# Patient Record
Sex: FEMALE | Race: BLACK OR AFRICAN AMERICAN | Employment: UNEMPLOYED | URBAN - METROPOLITAN AREA
[De-identification: names, ages, dates, MRNs, and addresses within clinical notes are randomized per-mention and may not be internally consistent; named-entity substitution may affect disease eponyms.]

---

## 2016-09-21 LAB
CHLAMYDIA, EXTERNAL: NEGATIVE
N. GONORRHEA, EXTERNAL: NEGATIVE

## 2016-10-05 LAB
ANTIBODY SCREEN, EXTERNAL: NEGATIVE
HBSAG, EXTERNAL: NEGATIVE
HCT, EXTERNAL: 38.7
HGB, EXTERNAL: 12.8
HIV, EXTERNAL: NEGATIVE
PLATELET CNT,   EXTERNAL: 297
RPR, EXTERNAL: NORMAL
RUBELLA, EXTERNAL: NORMAL
TYPE, ABO & RH, EXTERNAL: NORMAL
URINALYSIS, EXTERNAL: NEGATIVE

## 2017-02-02 ENCOUNTER — HOSPITAL ENCOUNTER (OUTPATIENT)
Age: 37
Setting detail: OBSERVATION
Discharge: HOME | End: 2017-02-02
Attending: OBSTETRICS & GYNECOLOGY | Admitting: OBSTETRICS & GYNECOLOGY
Payer: COMMERCIAL

## 2017-02-02 VITALS — WEIGHT: 201 LBS | BODY MASS INDEX: 32.3 KG/M2 | TEMPERATURE: 98.7 F | HEIGHT: 66 IN

## 2017-02-02 DIAGNOSIS — R51.9 NONINTRACTABLE EPISODIC HEADACHE, UNSPECIFIED HEADACHE TYPE: Primary | ICD-10-CM

## 2017-02-02 PROCEDURE — 99281 EMR DPT VST MAYX REQ PHY/QHP: CPT

## 2017-02-02 PROCEDURE — 99283 EMERGENCY DEPT VISIT LOW MDM: CPT | Performed by: OBSTETRICS & GYNECOLOGY

## 2017-02-02 PROCEDURE — 99218 HC RM OBSERVATION: CPT

## 2017-02-02 RX ORDER — PROMETHAZINE HYDROCHLORIDE 12.5 MG/1
TABLET ORAL
COMMUNITY
End: 2017-05-20

## 2017-02-02 RX ORDER — ALBUTEROL SULFATE 0.83 MG/ML
2.5 SOLUTION RESPIRATORY (INHALATION)
Qty: 24 EACH | Refills: 0 | Status: SHIPPED | OUTPATIENT
Start: 2017-02-02 | End: 2019-07-31 | Stop reason: SDUPTHER

## 2017-02-02 NOTE — DISCHARGE INSTRUCTIONS

## 2017-02-02 NOTE — PROGRESS NOTES
Pt arrived via wheelchair from ED c/o headache, body aches, general malaise, and back and abdominal pain. Vital signs stable. Pt placed on external monitor. No contractions noted. Positive fetal movement. SVE closed per Dr. Mario Paula. New orders to transfer to ED for further evaluation.

## 2017-02-02 NOTE — H&P
History & Physical    Name: Alireza Champagne MRN: [id number]  SSN: [id number]    YOB: 1980  Age: 31 y.o. Sex: female        Subjective:     Estimated Date of Delivery: 17  OB History      Para Term  AB TAB SAB Ectopic Multiple Living    4 3 3       3          Ms. Nael Kaur is admitted with pregnancy at 25w1d for evaluation from the ED for abdm pain and n/v/ diarrhea. Whole family sick and here in ED with her. ED declined to evaluate patient downstairs and sent to L&D. Prenatal course was normal. No records available now, states uncomplicated pregnancy. +FM, no vb ?uc    Past Medical History   Diagnosis Date    Asthma      as a child     History reviewed. No pertinent past surgical history. Social History     Occupational History    Not on file. Social History Main Topics    Smoking status: Never Smoker    Smokeless tobacco: Not on file    Alcohol use No    Drug use: No    Sexual activity: Yes     Partners: Male     Birth control/ protection: None     Family History   Problem Relation Age of Onset    Asthma Mother     Asthma Brother        No Known Allergies  Prior to Admission medications    Medication Sig Start Date End Date Taking? Authorizing Provider   promethazine (PHENERGAN) 12.5 mg tablet Take  by mouth every eight (8) hours as needed for Nausea. Yes Historical Provider        Review of Systems: Pertinent items are noted in HPI.     Objective:     Vitals:  Vitals:    17 1707 17 1711   Temp:  98.7 °F (37.1 °C)   Weight: 91.2 kg (201 lb)    Height: 5' 6\" (1.676 m)         Physical Exam:  General NAD  CVS: RRR no r/mg  Pulmonary: CTAB  Abdm: gravid NT  Back: LEE CVA NT, spine NT  LEE LE NT w/o edema  [place]: none  Membranes:  Intact  Fetal Heart Rate: Reactive  Baseline: 140 per minute  Variability: moderate  Accelerations: yes  Reactive for GA  Decelerations: none  SVE: long/cl  EFW: small  abdm soft NT no uC  OB stable    Prenatal Labs:   No results found for: RUBELLAEXT, GRBSEXT, HBSAGEXT, HIVEXT, RPREXT, GONNOEXT, CHLAMEXT     Assessment/Plan:     Plan: Admit for iup 25wks OB stable w/ systemic flu/GI symptoms along with family. recommend OB evaluation. ques answered, pt and family understand and agree. keep scheduled appts. Group B Strep was not tested.     Signed By:  Marcus Molina MD     February 2, 2017

## 2017-02-02 NOTE — ED TRIAGE NOTES
Pt is 6 months pregnant, , abdominal and back pain, generalized body aches, contractions and headaches. Spoke with labor and delivery charge rn, patient to labor and delivery with ed paramedic.

## 2017-02-24 LAB — GTT, 1 HR, GLUCOLA, EXTERNAL: 107

## 2017-03-09 ENCOUNTER — HOSPITAL ENCOUNTER (OUTPATIENT)
Dept: PERINATAL CARE | Age: 37
Discharge: HOME | End: 2017-03-09
Attending: OBSTETRICS & GYNECOLOGY

## 2017-03-10 ENCOUNTER — HOSPITAL ENCOUNTER (OUTPATIENT)
Dept: PERINATAL CARE | Age: 37
Discharge: HOME | End: 2017-03-10
Attending: OBSTETRICS & GYNECOLOGY
Payer: COMMERCIAL

## 2017-03-10 PROCEDURE — 76811 OB US DETAILED SNGL FETUS: CPT | Performed by: OBSTETRICS & GYNECOLOGY

## 2017-03-15 ENCOUNTER — HOSPITAL ENCOUNTER (OUTPATIENT)
Age: 37
Setting detail: OBSERVATION
Discharge: HOME | End: 2017-03-15
Attending: OBSTETRICS & GYNECOLOGY | Admitting: STUDENT IN AN ORGANIZED HEALTH CARE EDUCATION/TRAINING PROGRAM
Payer: COMMERCIAL

## 2017-03-15 VITALS
TEMPERATURE: 98.2 F | WEIGHT: 201 LBS | HEIGHT: 68 IN | OXYGEN SATURATION: 99 % | DIASTOLIC BLOOD PRESSURE: 77 MMHG | BODY MASS INDEX: 30.46 KG/M2 | SYSTOLIC BLOOD PRESSURE: 128 MMHG | RESPIRATION RATE: 16 BRPM | HEART RATE: 92 BPM

## 2017-03-15 LAB
APPEARANCE UR: CLEAR
BACTERIA URNS QL MICRO: ABNORMAL /HPF
BILIRUB UR QL: NEGATIVE
COLOR UR: ABNORMAL
EPITH CASTS URNS QL MICRO: ABNORMAL /LPF
GLUCOSE UR STRIP.AUTO-MCNC: NEGATIVE MG/DL
HGB UR QL STRIP: NEGATIVE
HYALINE CASTS URNS QL MICRO: ABNORMAL /LPF (ref 0–5)
KETONES UR QL STRIP.AUTO: NEGATIVE MG/DL
LEUKOCYTE ESTERASE UR QL STRIP.AUTO: NEGATIVE
NITRITE UR QL STRIP.AUTO: NEGATIVE
PH UR STRIP: 8 [PH] (ref 5–8)
PROT UR STRIP-MCNC: NEGATIVE MG/DL
RBC #/AREA URNS HPF: ABNORMAL /HPF (ref 0–5)
SP GR UR REFRACTOMETRY: 1.02 (ref 1–1.03)
UA: UC IF INDICATED,UAUC: ABNORMAL
UROBILINOGEN UR QL STRIP.AUTO: 1 EU/DL (ref 0.2–1)
WBC URNS QL MICRO: ABNORMAL /HPF (ref 0–4)

## 2017-03-15 PROCEDURE — 74011250637 HC RX REV CODE- 250/637: Performed by: STUDENT IN AN ORGANIZED HEALTH CARE EDUCATION/TRAINING PROGRAM

## 2017-03-15 PROCEDURE — 87086 URINE CULTURE/COLONY COUNT: CPT | Performed by: STUDENT IN AN ORGANIZED HEALTH CARE EDUCATION/TRAINING PROGRAM

## 2017-03-15 PROCEDURE — 99218 HC RM OBSERVATION: CPT

## 2017-03-15 PROCEDURE — 99283 EMERGENCY DEPT VISIT LOW MDM: CPT | Performed by: OBSTETRICS & GYNECOLOGY

## 2017-03-15 PROCEDURE — 81001 URINALYSIS AUTO W/SCOPE: CPT | Performed by: STUDENT IN AN ORGANIZED HEALTH CARE EDUCATION/TRAINING PROGRAM

## 2017-03-15 PROCEDURE — 75810000275 HC EMERGENCY DEPT VISIT NO LEVEL OF CARE

## 2017-03-15 RX ORDER — ACETAMINOPHEN 325 MG/1
650 TABLET ORAL
Status: DISCONTINUED | OUTPATIENT
Start: 2017-03-15 | End: 2017-03-16 | Stop reason: HOSPADM

## 2017-03-15 RX ADMIN — ACETAMINOPHEN 650 MG: 325 TABLET ORAL at 20:09

## 2017-03-15 NOTE — IP AVS SNAPSHOT
Current Discharge Medication List  
  
ASK your doctor about these medications Dose & Instructions Dispensing Information Comments Morning Noon Evening Bedtime  
 albuterol 2.5 mg /3 mL (0.083 %) nebulizer solution Commonly known as:  PROVENTIL VENTOLIN Your last dose was: Your next dose is:    
   
   
 Dose:  2.5 mg  
3 mL by Nebulization route every four (4) hours as needed for Wheezing. Quantity:  24 Each Refills:  0  
     
   
   
   
  
 promethazine 12.5 mg tablet Commonly known as:  PHENERGAN Your last dose was: Your next dose is: Take  by mouth every eight (8) hours as needed for Nausea. Refills:  0

## 2017-03-15 NOTE — PROGRESS NOTES
1932  Patient arrived on unit in wheelchair with family. Urine sample obtained. Patient complaint of sharp pain in abdomen feeling like she needs to have bowel movement and may be having contractions. Patient came to hospital prior to calling physician. Patient placed on monitor for fetal monitoring. 2000  Spoke with Dr Tay Burden, orders received for UA and NST and 650 mg tylenol and encourage PO intake. 2040  Patient states she continues to have abdominal pain, palpated her uterus during one of her painful episodes and it remains soft. Patient encouraged to drink additional water and we will reevaluate shortly. 2125  Spoke with Dr Tay Burden, relaying information about reactive NST and patient continuing to have constant abdominal pain. Per Dr Tay Burden, ok to discharge patient home , encourage water intake and call office in the am to be seen tomorrow if pain persists. Also instruct patient that if pain becomes worse, call on call physician tonight. 2130  Patient agreeable to discharge.

## 2017-03-15 NOTE — IP AVS SNAPSHOT
[address] [place] [address] 104 1007 [facility] 
[phone] Patient: Marielle Poster MRN: [id number] DTY:[birth year] You are allergic to the following No active allergies Recent Documentation Height Weight BMI OB Status Smoking Status 1.727 m 91.2 kg 30.56 kg/m2 Pregnant Never Smoker Unresulted Labs Order Current Status CULTURE, URINE In process Emergency Contacts Name Discharge Info Relation Home Work Mobile Leighann Pacheco DISCHARGE CAREGIVER [3] Boyfriend [17] [phone] About your hospitalization You were admitted on:  March 15, 2017 You last received care in the:  [facility] 2 LABOR & DELIVERY You were discharged on:  March 15, 2017 Unit phone number:  [phone] Why you were hospitalized Your primary diagnosis was:  Not on File Providers Seen During Your Hospitalizations Provider Role Specialty Primary office phone Maria Ines Martin MD Attending Provider Obstetrics & Gynecology [phone] Your Primary Care Physician (PCP) Primary Care Physician Office Phone Office Fax NONE ** None ** ** None ** Follow-up Information Follow up With Details Comments Contact Info None   None (395) Patient stated that they have no PCP Current Discharge Medication List  
  
ASK your doctor about these medications Dose & Instructions Dispensing Information Comments Morning Noon Evening Bedtime  
 albuterol 2.5 mg /3 mL (0.083 %) nebulizer solution Commonly known as:  PROVENTIL VENTOLIN Your last dose was: Your next dose is:    
   
   
 Dose:  2.5 mg  
3 mL by Nebulization route every four (4) hours as needed for Wheezing. Quantity:  24 Each Refills:  0  
     
   
   
   
  
 promethazine 12.5 mg tablet Commonly known as:  PHENERGAN Your last dose was: Your next dose is: Take  by mouth every eight (8) hours as needed for Nausea. Refills:  0 Discharge Instructions Please call the office [phone] when they open at 8:30am and ask to be seen regarding your abdominal pain. Let them know you were here tonight for abdominal pain and were encouraged to call the office to be seen. If your pain becomes worse tonight, please call the on call doctor at [phone]. Continue to drink plenty of fluid, aim for at least 4 of the cups you received here at the hospital.   
 
 
 Weeks 30 to 32 of Your Pregnancy: Care Instructions Your Care Instructions You have made it to the final months of your pregnancy. By now, your baby is really starting to look like a baby, with hair and plump skin. As you enter the final weeks of pregnancy, the reality of having a baby may start to set in. This is the time to settle on a name, get your household in order, set up a safe nursery, and find quality  if needed. Doing these things in advance will allow you to focus on caring for and enjoying your new baby. You may also want to have a tour of your hospital's labor and delivery unit to get a better idea of what to expect while you are in the hospital. 
During these last months, it is very important to take good care of yourself and pay attention to what your body needs. If your doctor says it is okay for you to work, don't push yourself too hard. Use the tips provided in this care sheet to ease heartburn and care for varicose veins. If you haven't already had the Tdap shot during this pregnancy, talk to your doctor about getting it. It will help protect your  against pertussis infection. Follow-up care is a key part of your treatment and safety. Be sure to make and go to all appointments, and call your doctor if you are having problems. It's also a good idea to know your test results and keep a list of the medicines you take. How can you care for yourself at home? Pay attention to your baby's movements · You should feel your baby move several times every day. · Your baby now turns less, and kicks and jabs more. · Your baby sleeps 20 to 45 minutes at a time and is more active at certain times of day. · If your doctor wants you to count your baby's kicks: 
¨ Empty your bladder, and lie on your side or relax in a comfortable chair. ¨ Write down your start time. ¨ Pay attention only to your baby's movements. Count any movement except hiccups. ¨ After you have counted 10 movements, write down your stop time. ¨ Write down how many minutes it took for your baby to move 10 times. ¨ If an hour goes by and you have not recorded 10 movements, have something to eat or drink and then count for another hour. If you do not record 10 movements in either hour, call your doctor. Ease heartburn · Eat small, frequent meals. · Do not eat chocolate, peppermint, or very spicy foods. Avoid drinks with caffeine, such as coffee, tea, and sodas. · Avoid bending over or lying down after meals. · Talk a short walk after you eat. · If heartburn is a problem at night, do not eat for 2 hours before bedtime. · Take antacids like Mylanta, Maalox, Rolaids, or Tums. Do not take antacids that have sodium bicarbonate. Care for varicose veins · Varicose veins are blood vessels that stretch out with the extra blood during pregnancy. Your legs may ache or throb. Most varicose veins will go away after the birth. · Avoid standing for long periods of time. Sit with your legs crossed at the ankles, not the knees. · Sit with your feet propped up. · Avoid tight clothing or stockings. Wear support hose. · Exercise regularly. Try walking for at least 30 minutes a day. Where can you learn more? Go to http://glnen-alex.info/. Enter D774 in the search box to learn more about \"Weeks 30 to 32 of Your Pregnancy: Care Instructions. \" 
 Current as of: May 30, 2016 Content Version: 11.1 © 3742-0575 [employer] Care instructions adapted under license by Transfer[employer] (which disclaims liability or warranty for this information). If you have questions about a medical condition or this instruction, always ask your healthcare professional. [address] any warranty or liability for your use of this information. Counting Your Baby's Kicks: Care Instructions Your Care Instructions Counting your baby's kicks is one way your doctor can tell that your baby is healthy. Most womenespecially in a first pregnancyfeel their baby move for the first time between 16 and 22 weeks. The movement may feel like flutters rather than kicks. Your baby may move more at certain times of the day. When you are active, you may notice less kicking than when you are resting. At your prenatal visits, your doctor will ask whether the baby is active. In your last trimester, your doctor may ask you to count the number of times you feel your baby move. Follow-up care is a key part of your treatment and safety. Be sure to make and go to all appointments, and call your doctor if you are having problems. It's also a good idea to know your test results and keep a list of the medicines you take. How do you count fetal kicks? · A common method of checking your baby's movement is to count the number of kicks or moves you feel in 1 hour. Ten movements (such as kicks, flutters, or rolls) in 1 hour are normal. Some doctors suggest that you count in the morning until you get to 10 movements. Then you can quit for that day and start again the next day. · Pick your baby's most active time of day to count. This may be any time from morning to evening. · If you do not feel 10 movements in an hour, your baby may be sleeping. Wait for the next hour and count again. When should you call for help? Call your doctor now or seek immediate medical care if: 
· You noticed that your baby has stopped moving or is moving much less than normal. 
Watch closely for changes in your health, and be sure to contact your doctor if you have any problems. Where can you learn more? Go to http://glenn-alex.info/. Enter A404 in the search box to learn more about \"Counting Your Baby's Kicks: Care Instructions. \" Current as of: May 30, 2016 Content Version: 11.1 © 1630-3565 [employer]. Care instructions adapted under license by Talkpush (which disclaims liability or warranty for this information). If you have questions about a medical condition or this instruction, always ask your healthcare professional. [address] any warranty or liability for your use of this information. Discharge Orders None Introducing hospitals & HEALTH SERVICES! [facility] introduces [email or web address] patient portal. Now you can access parts of your medical record, email your doctor's office, and request medication refills online. 1. In your internet browser, go to [email or web address] 2. Click on the First Time User? Click Here link in the Sign In box. You will see the New Member Sign Up page. 3. Enter your [email or web address] Access Code exactly as it appears below. You will not need to use this code after youve completed the sign-up process. If you do not sign up before the expiration date, you must request a new code. · [email or web address] Access Code: [email or web address] Expires: 5/3/2017  6:52 PM 
 
4. Enter the last four digits of your Social Security Number (xxxx) and Date of Birth (mm/dd/yyyy) as indicated and click Submit. You will be taken to the next sign-up page. 5. Create a [email or web address] ID. This will be your [email or web address] login ID and cannot be changed, so think of one that is secure and easy to remember. 6. Create a [email or web address] password. You can change your password at any time. 7. Enter your Password Reset Question and Answer. This can be used at a later time if you forget your password. 8. Enter your e-mail address. You will receive e-mail notification when new information is available in [address] 9. Click Sign Up. You can now view and download portions of your medical record. 10. Click the Download Summary menu link to download a portable copy of your medical information. If you have questions, please visit the Frequently Asked Questions section of the Gameyola website. Remember, Gameyola is NOT to be used for urgent needs. For medical emergencies, dial 911. Now available from your iPhone and Android! General Information Please provide this summary of care documentation to your next provider. Patient Signature:  ____________________________________________________________ Date:  ____________________________________________________________  
  
[facility] Provider Signature:  ____________________________________________________________ Date:  ____________________________________________________________

## 2017-03-15 NOTE — IP AVS SNAPSHOT
Summary of Care Report The Summary of Care report has been created to help improve care coordination. Users with access to TUTORize or [address] (Web-based application) may access additional patient information including the Discharge Summary. If you are not currently a [address] user and need more information, please call the number listed below in the [place] [address] section and ask to be connected with Medical Records. Facility Information Name Address Phone [address] [facility] [place] [address] [phone] Patient Information Patient Name Sex  Jake Folds ([id number]) Female 1980 Discharge Information Admitting Provider Service Area Unit Anant Vasquez, DO / [phone] [facility] 2 Labor & Delivery / [phone] Discharge Provider Discharge Date/Time Discharge Disposition Destination (none) 3/15/2017 21:31 (Pending) AHR (none) Patient Language Language ENGLISH [13] You are allergic to the following No active allergies Current Discharge Medication List  
  
ASK your doctor about these medications Dose & Instructions Dispensing Information Comments  
 albuterol 2.5 mg /3 mL (0.083 %) nebulizer solution Commonly known as:  PROVENTIL VENTOLIN Dose:  2.5 mg  
3 mL by Nebulization route every four (4) hours as needed for Wheezing. Quantity:  24 Each Refills:  0  
   
 promethazine 12.5 mg tablet Commonly known as:  PHENERGAN Take  by mouth every eight (8) hours as needed for Nausea. Refills:  0 Follow-up Information Follow up With Details Comments Contact Info None   None (395) Patient stated that they have no PCP Discharge Instructions Please call the office [phone] when they open at 8:30am and ask to be seen regarding your abdominal pain. Let them know you were here tonight for abdominal pain and were encouraged to call the office to be seen. If your pain becomes worse tonight, please call the on call doctor at [phone]. Continue to drink plenty of fluid, aim for at least 4 of the cups you received here at the hospital.   
 
 
 Weeks 30 to 32 of Your Pregnancy: Care Instructions Your Care Instructions You have made it to the final months of your pregnancy. By now, your baby is really starting to look like a baby, with hair and plump skin. As you enter the final weeks of pregnancy, the reality of having a baby may start to set in. This is the time to settle on a name, get your household in order, set up a safe nursery, and find quality  if needed. Doing these things in advance will allow you to focus on caring for and enjoying your new baby. You may also want to have a tour of your hospital's labor and delivery unit to get a better idea of what to expect while you are in the hospital. 
During these last months, it is very important to take good care of yourself and pay attention to what your body needs. If your doctor says it is okay for you to work, don't push yourself too hard. Use the tips provided in this care sheet to ease heartburn and care for varicose veins. If you haven't already had the Tdap shot during this pregnancy, talk to your doctor about getting it. It will help protect your  against pertussis infection. Follow-up care is a key part of your treatment and safety. Be sure to make and go to all appointments, and call your doctor if you are having problems. It's also a good idea to know your test results and keep a list of the medicines you take. How can you care for yourself at home? Pay attention to your baby's movements · You should feel your baby move several times every day. · Your baby now turns less, and kicks and jabs more. · Your baby sleeps 20 to 45 minutes at a time and is more active at certain times of day. · If your doctor wants you to count your baby's kicks: 
¨ Empty your bladder, and lie on your side or relax in a comfortable chair. ¨ Write down your start time. ¨ Pay attention only to your baby's movements. Count any movement except hiccups. ¨ After you have counted 10 movements, write down your stop time. ¨ Write down how many minutes it took for your baby to move 10 times. ¨ If an hour goes by and you have not recorded 10 movements, have something to eat or drink and then count for another hour. If you do not record 10 movements in either hour, call your doctor. Ease heartburn · Eat small, frequent meals. · Do not eat chocolate, peppermint, or very spicy foods. Avoid drinks with caffeine, such as coffee, tea, and sodas. · Avoid bending over or lying down after meals. · Talk a short walk after you eat. · If heartburn is a problem at night, do not eat for 2 hours before bedtime. · Take antacids like Mylanta, Maalox, Rolaids, or Tums. Do not take antacids that have sodium bicarbonate. Care for varicose veins · Varicose veins are blood vessels that stretch out with the extra blood during pregnancy. Your legs may ache or throb. Most varicose veins will go away after the birth. · Avoid standing for long periods of time. Sit with your legs crossed at the ankles, not the knees. · Sit with your feet propped up. · Avoid tight clothing or stockings. Wear support hose. · Exercise regularly. Try walking for at least 30 minutes a day. Where can you learn more? Go to http://glenn-alex.info/. Enter E510 in the search box to learn more about \"Weeks 30 to 32 of Your Pregnancy: Care Instructions. \" Current as of: May 30, 2016 Content Version: 11.1 © 6700-0135 [employer].  Care instructions adapted under license by Navmii (which disclaims liability or warranty for this information). If you have questions about a medical condition or this instruction, always ask your healthcare professional. [address] any warranty or liability for your use of this information. Counting Your Baby's Kicks: Care Instructions Your Care Instructions Counting your baby's kicks is one way your doctor can tell that your baby is healthy. Most womenespecially in a first pregnancyfeel their baby move for the first time between 16 and 22 weeks. The movement may feel like flutters rather than kicks. Your baby may move more at certain times of the day. When you are active, you may notice less kicking than when you are resting. At your prenatal visits, your doctor will ask whether the baby is active. In your last trimester, your doctor may ask you to count the number of times you feel your baby move. Follow-up care is a key part of your treatment and safety. Be sure to make and go to all appointments, and call your doctor if you are having problems. It's also a good idea to know your test results and keep a list of the medicines you take. How do you count fetal kicks? · A common method of checking your baby's movement is to count the number of kicks or moves you feel in 1 hour. Ten movements (such as kicks, flutters, or rolls) in 1 hour are normal. Some doctors suggest that you count in the morning until you get to 10 movements. Then you can quit for that day and start again the next day. · Pick your baby's most active time of day to count. This may be any time from morning to evening. · If you do not feel 10 movements in an hour, your baby may be sleeping. Wait for the next hour and count again. When should you call for help?  
Call your doctor now or seek immediate medical care if: 
· You noticed that your baby has stopped moving or is moving much less than normal. 
Watch closely for changes in your health, and be sure to contact your doctor if you have any problems. Where can you learn more? Go to http://glenn-alex.info/. Enter P505 in the search box to learn more about \"Counting Your Baby's Kicks: Care Instructions. \" Current as of: May 30, 2016 Content Version: 11.1 © 6618-4972 [employer]. Care instructions adapted under license by [employer] (which disclaims liability or warranty for this information). If you have questions about a medical condition or this instruction, always ask your healthcare professional. [place] [address] any warranty or liability for your use of this information. Chart Review Routing History No Routing History on File

## 2017-03-15 NOTE — ED TRIAGE NOTES
31 weeks pregnant sharp lower abdominal pain and back pain. Feels like she has to have BM for most of the day. Pt of Dr Royal Mckoy. Did not call.

## 2017-03-16 NOTE — DISCHARGE INSTRUCTIONS
Please call the office [phone] when they open at 8:30am and ask to be seen regarding your abdominal pain. Let them know you were here tonight for abdominal pain and were encouraged to call the office to be seen. If your pain becomes worse tonight, please call the on call doctor at [phone]. Continue to drink plenty of fluid, aim for at least 4 of the cups you received here at the hospital.         Weeks 30 to 32 of Your Pregnancy: Care Instructions  Your Care Instructions    You have made it to the final months of your pregnancy. By now, your baby is really starting to look like a baby, with hair and plump skin. As you enter the final weeks of pregnancy, the reality of having a baby may start to set in. This is the time to settle on a name, get your household in order, set up a safe nursery, and find quality  if needed. Doing these things in advance will allow you to focus on caring for and enjoying your new baby. You may also want to have a tour of your hospital's labor and delivery unit to get a better idea of what to expect while you are in the hospital.  During these last months, it is very important to take good care of yourself and pay attention to what your body needs. If your doctor says it is okay for you to work, don't push yourself too hard. Use the tips provided in this care sheet to ease heartburn and care for varicose veins. If you haven't already had the Tdap shot during this pregnancy, talk to your doctor about getting it. It will help protect your  against pertussis infection. Follow-up care is a key part of your treatment and safety. Be sure to make and go to all appointments, and call your doctor if you are having problems. It's also a good idea to know your test results and keep a list of the medicines you take. How can you care for yourself at home? Pay attention to your baby's movements  · You should feel your baby move several times every day.   · Your baby now turns less, and kicks and jabs more. · Your baby sleeps 20 to 45 minutes at a time and is more active at certain times of day. · If your doctor wants you to count your baby's kicks:  ¨ Empty your bladder, and lie on your side or relax in a comfortable chair. ¨ Write down your start time. ¨ Pay attention only to your baby's movements. Count any movement except hiccups. ¨ After you have counted 10 movements, write down your stop time. ¨ Write down how many minutes it took for your baby to move 10 times. ¨ If an hour goes by and you have not recorded 10 movements, have something to eat or drink and then count for another hour. If you do not record 10 movements in either hour, call your doctor. Ease heartburn  · Eat small, frequent meals. · Do not eat chocolate, peppermint, or very spicy foods. Avoid drinks with caffeine, such as coffee, tea, and sodas. · Avoid bending over or lying down after meals. · Talk a short walk after you eat. · If heartburn is a problem at night, do not eat for 2 hours before bedtime. · Take antacids like Mylanta, Maalox, Rolaids, or Tums. Do not take antacids that have sodium bicarbonate. Care for varicose veins  · Varicose veins are blood vessels that stretch out with the extra blood during pregnancy. Your legs may ache or throb. Most varicose veins will go away after the birth. · Avoid standing for long periods of time. Sit with your legs crossed at the ankles, not the knees. · Sit with your feet propped up. · Avoid tight clothing or stockings. Wear support hose. · Exercise regularly. Try walking for at least 30 minutes a day. Where can you learn more? Go to http://glenn-alex.info/. Enter J362 in the search box to learn more about \"Weeks 30 to 32 of Your Pregnancy: Care Instructions. \"  Current as of: May 30, 2016  Content Version: 11.1  © 3875-6320 [employer].  Care instructions adapted under license by [employer] (which disclaims liability or warranty for this information). If you have questions about a medical condition or this instruction, always ask your healthcare professional. [address] any warranty or liability for your use of this information. Counting Your Baby's Kicks: Care Instructions  Your Care Instructions  Counting your baby's kicks is one way your doctor can tell that your baby is healthy. Most women--especially in a first pregnancy--feel their baby move for the first time between 16 and 22 weeks. The movement may feel like flutters rather than kicks. Your baby may move more at certain times of the day. When you are active, you may notice less kicking than when you are resting. At your prenatal visits, your doctor will ask whether the baby is active. In your last trimester, your doctor may ask you to count the number of times you feel your baby move. Follow-up care is a key part of your treatment and safety. Be sure to make and go to all appointments, and call your doctor if you are having problems. It's also a good idea to know your test results and keep a list of the medicines you take. How do you count fetal kicks? · A common method of checking your baby's movement is to count the number of kicks or moves you feel in 1 hour. Ten movements (such as kicks, flutters, or rolls) in 1 hour are normal. Some doctors suggest that you count in the morning until you get to 10 movements. Then you can quit for that day and start again the next day. · Pick your baby's most active time of day to count. This may be any time from morning to evening. · If you do not feel 10 movements in an hour, your baby may be sleeping. Wait for the next hour and count again. When should you call for help?   Call your doctor now or seek immediate medical care if:  · You noticed that your baby has stopped moving or is moving much less than normal.  Watch closely for changes in your health, and be sure to contact your doctor if you have any problems. Where can you learn more? Go to http://glenn-alex.info/. Enter Z374 in the search box to learn more about \"Counting Your Baby's Kicks: Care Instructions. \"  Current as of: May 30, 2016  Content Version: 11.1  © 3796-7564 [employer]. Care instructions adapted under license by [employer] (which disclaims liability or warranty for this information). If you have questions about a medical condition or this instruction, always ask your healthcare professional. [address] any warranty or liability for your use of this information.

## 2017-03-17 LAB
BACTERIA SPEC CULT: ABNORMAL
CC UR VC: ABNORMAL
SERVICE CMNT-IMP: ABNORMAL

## 2017-03-31 ENCOUNTER — HOSPITAL ENCOUNTER (OUTPATIENT)
Dept: PERINATAL CARE | Age: 37
Discharge: HOME | End: 2017-03-31
Attending: OBSTETRICS & GYNECOLOGY
Payer: COMMERCIAL

## 2017-03-31 PROCEDURE — 76816 OB US FOLLOW-UP PER FETUS: CPT | Performed by: OBSTETRICS & GYNECOLOGY

## 2017-04-13 LAB — GRBS, EXTERNAL: NEGATIVE

## 2017-05-14 ENCOUNTER — HOSPITAL ENCOUNTER (EMERGENCY)
Age: 37
Discharge: HOME | End: 2017-05-14
Attending: OBSTETRICS & GYNECOLOGY | Admitting: OBSTETRICS & GYNECOLOGY
Payer: COMMERCIAL

## 2017-05-14 VITALS
BODY MASS INDEX: 32.49 KG/M2 | DIASTOLIC BLOOD PRESSURE: 71 MMHG | TEMPERATURE: 98 F | RESPIRATION RATE: 16 BRPM | HEART RATE: 84 BPM | WEIGHT: 207 LBS | HEIGHT: 67 IN | SYSTOLIC BLOOD PRESSURE: 112 MMHG

## 2017-05-14 PROCEDURE — 59025 FETAL NON-STRESS TEST: CPT | Performed by: OBSTETRICS & GYNECOLOGY

## 2017-05-14 PROCEDURE — 74011250636 HC RX REV CODE- 250/636: Performed by: OBSTETRICS & GYNECOLOGY

## 2017-05-14 PROCEDURE — 99284 EMERGENCY DEPT VISIT MOD MDM: CPT | Performed by: OBSTETRICS & GYNECOLOGY

## 2017-05-14 PROCEDURE — 96360 HYDRATION IV INFUSION INIT: CPT | Performed by: OBSTETRICS & GYNECOLOGY

## 2017-05-14 PROCEDURE — 75810000275 HC EMERGENCY DEPT VISIT NO LEVEL OF CARE

## 2017-05-14 RX ORDER — SODIUM CHLORIDE, SODIUM LACTATE, POTASSIUM CHLORIDE, CALCIUM CHLORIDE 600; 310; 30; 20 MG/100ML; MG/100ML; MG/100ML; MG/100ML
1000 INJECTION, SOLUTION INTRAVENOUS ONCE
Status: COMPLETED | OUTPATIENT
Start: 2017-05-14 | End: 2017-05-14

## 2017-05-14 RX ORDER — SODIUM CHLORIDE, SODIUM LACTATE, POTASSIUM CHLORIDE, CALCIUM CHLORIDE 600; 310; 30; 20 MG/100ML; MG/100ML; MG/100ML; MG/100ML
125 INJECTION, SOLUTION INTRAVENOUS CONTINUOUS
Status: DISCONTINUED | OUTPATIENT
Start: 2017-05-14 | End: 2017-05-14 | Stop reason: HOSPADM

## 2017-05-14 RX ADMIN — SODIUM CHLORIDE, SODIUM LACTATE, POTASSIUM CHLORIDE, AND CALCIUM CHLORIDE 125 ML/HR: 600; 310; 30; 20 INJECTION, SOLUTION INTRAVENOUS at 02:43

## 2017-05-14 RX ADMIN — SODIUM CHLORIDE, SODIUM LACTATE, POTASSIUM CHLORIDE, AND CALCIUM CHLORIDE 1000 ML: 600; 310; 30; 20 INJECTION, SOLUTION INTRAVENOUS at 01:45

## 2017-05-14 NOTE — IP AVS SNAPSHOT
Summary of Care Report The Summary of Care report has been created to help improve care coordination. Users with access to [employer] or [address] (Web-based application) may access additional patient information including the Discharge Summary. If you are not currently a [address] user and need more information, please call the number listed below in the [place] [address] section and ask to be connected with Medical Records. Facility Information Name Address Phone [address] [place] [id number] [phone] Patient Information Patient Name Sex  Bello Garcia ([id number]) Female 1980 Discharge Information Admitting Provider Service Area Unit Henri Prado MD / [phone] 2 Labor & Delivery / [phone] Discharge Provider Discharge Date/Time Discharge Disposition Destination (none) 2017 02:56 (Pending) AHR (none) Patient Language Language ENGLISH [13] You are allergic to the following No active allergies Current Discharge Medication List  
  
ASK your doctor about these medications Dose & Instructions Dispensing Information Comments  
 albuterol 2.5 mg /3 mL (0.083 %) nebulizer solution Commonly known as:  PROVENTIL VENTOLIN Dose:  2.5 mg  
3 mL by Nebulization route every four (4) hours as needed for Wheezing. Quantity:  24 Each Refills:  0  
   
 promethazine 12.5 mg tablet Commonly known as:  PHENERGAN Take  by mouth every eight (8) hours as needed for Nausea. Refills:  0 Follow-up Information Follow up With Details Comments Contact Info None   None ([phone]) Patient stated that they have no PCP Discharge Instructions MyChart Activation Thank you for requesting access to [facility]. Please follow the instructions below to securely access and download your online medical record. [facility] allows you to send messages to your doctor, view your test results, renew your prescriptions, schedule appointments, and more. How Do I Sign Up? 1. In your internet browser, go to [email or web address] 
2. Click on the First Time User? Click Here link in the Sign In box. You will be redirect to the New Member Sign Up page. 3. Enter your [facility] Access Code exactly as it appears below. You will not need to use this code after youve completed the sign-up process. If you do not sign up before the expiration date, you must request a new code. [facility] Access Code: [email or web address] Expires: 2017  2:57 AM (This is the date your [facility] access code will ) 4. Enter the last four digits of your Social Security Number (xxxx) and Date of Birth (mm/dd/yyyy) as indicated and click Submit. You will be taken to the next sign-up page. 5. Create a [facility] ID. This will be your [facility] login ID and cannot be changed, so think of one that is secure and easy to remember. 6. Create a [facility] password. You can change your password at any time. 7. Enter your Password Reset Question and Answer. This can be used at a later time if you forget your password. 8. Enter your e-mail address. You will receive e-mail notification when new information is available in [address]. 9. Click Sign Up. You can now view and download portions of your medical record. 10. Click the Download Summary menu link to download a portable copy of your medical information. Additional Information If you have questions, please visit the Frequently Asked Questions section of the [employer] website at [email or web address] Remember, [facility] is NOT to be used for urgent needs. For medical emergencies, dial 911. Pregnancy Precautions: Care Instructions Your Care Instructions There is no sure way to prevent labor before your due date ( labor) or to prevent most other pregnancy problems. But there are things you can do to increase your chances of a healthy pregnancy. Go to your appointments, follow your doctor's advice, and take good care of yourself. Eat well, and exercise (if your doctor agrees). And make sure to drink plenty of water. Follow-up care is a key part of your treatment and safety. Be sure to make and go to all appointments, and call your doctor if you are having problems. It's also a good idea to know your test results and keep a list of the medicines you take. How can you care for yourself at home? · Make sure you go to your prenatal appointments. At each visit, your doctor will check your blood pressure. Your doctor will also check to see if you have protein in your urine. High blood pressure and protein in urine are signs of preeclampsia. This condition can be dangerous for you and your baby. · Drink plenty of fluids, enough so that your urine is light yellow or clear like water. Dehydration can cause contractions. If you have kidney, heart, or liver disease and have to limit fluids, talk with your doctor before you increase the amount of fluids you drink. · Tell your doctor right away if you notice any symptoms of an infection, such as: ¨ Burning when you urinate. ¨ A foul-smelling discharge from your vagina. ¨ Vaginal itching. ¨ Unexplained fever. ¨ Unusual pain or soreness in your uterus or lower belly. · Eat a balanced diet. Include plenty of foods that are high in calcium and iron. ¨ Foods high in calcium include milk, cheese, yogurt, almonds, and broccoli. ¨ Foods high in iron include red meat, shellfish, poultry, eggs, beans, raisins, whole-grain bread, and leafy green vegetables. · Do not smoke. If you need help quitting, talk to your doctor about stop-smoking programs and medicines.  These can increase your chances of quitting for good. · Do not drink alcohol or use illegal drugs. · Follow your doctor's directions about activity. Your doctor will let you know how much, if any, exercise you can do. · Ask your doctor if you can have sex. If you are at risk for early labor, your doctor may ask you to not have sex. · Take care to prevent falls. During pregnancy, your joints are loose, and your balance is off. Sports such as bicycling, skiing, or in-line skating can increase your risk of falling. And don't ride horses or motorcycles, dive, water ski, scuba dive, or parachute jump while you are pregnant. · Avoid getting very hot. Do not use saunas or hot tubs. Avoid staying out in the sun in hot weather for long periods. Take acetaminophen (Tylenol) to lower a high fever. · Do not take any over-the-counter or herbal medicines or supplements without talking to your doctor or pharmacist first. 
When should you call for help? Call 911 anytime you think you may need emergency care. For example, call if: 
· You passed out (lost consciousness). · You have severe vaginal bleeding. · You have severe pain in your belly or pelvis. · You have had fluid gushing or leaking from your vagina and you know or think the umbilical cord is bulging into your vagina. If this happens, immediately get down on your knees so your rear end (buttocks) is higher than your head. This will decrease the pressure on the cord until help arrives. Call your doctor now or seek immediate medical care if: 
· You have signs of preeclampsia, such as: 
¨ Sudden swelling of your face, hands, or feet. ¨ New vision problems (such as dimness or blurring). ¨ A severe headache. · You have any vaginal bleeding. · You have belly pain or cramping. · You have a fever. · You have had regular contractions (with or without pain) for an hour. This means that you have 8 or more within 1 hour or 4 or more in 20 minutes after you change your position and drink fluids. · You have a sudden release of fluid from your vagina. · You have low back pain or pelvic pressure that does not go away. · You notice that your baby has stopped moving or is moving much less than normal. 
Watch closely for changes in your health, and be sure to contact your doctor if you have any problems. Where can you learn more? Go to http://glenn-alex.info/. Enter [id number] in the search box to learn more about \"Pregnancy Precautions: Care Instructions. \" Current as of: May 30, 2016 Content Version: 11.2 © 5148-7118 [employer]. Care instructions adapted under license by [employer] (which disclaims liability or warranty for this information). If you have questions about a medical condition or this instruction, always ask your healthcare professional. [address] any warranty or liability for your use of this information. Chart Review Routing History No Routing History on File

## 2017-05-14 NOTE — PROGRESS NOTES
Dr Aguilar Mean notified of pts arrival:  c/o ucs & lower back discomfort, SVE reveals 2/thick/high(unchanged from office), diarrhea for past 4 days, nausea(takes phenergan for this, last took yesterday), 1 uc in past 20min, awaiting reactive tracing, declines flu/tdap. MD verbalizes understanding. Order for 1L LR IV bolus now, 4mg zofran IV PRN n/v, recheck cervix in 2hrs & report result to MD.  7596 verbal order from Dr Yumiko Abdul for [address] now. 8935 Dr Yumiko Abdul notified SVE reveals unchanged, has had 1L bolus. MD verbalizes understanding & order to discharge now.  Mortimer Koller discharge instructions given, verbally & preprinted.  Pt signs & verbalizes understanding

## 2017-05-14 NOTE — DISCHARGE INSTRUCTIONS
ItsOn Activation    Thank you for requesting access to ItsOn. Please follow the instructions below to securely access and download your online medical record. ItsOn allows you to send messages to your doctor, view your test results, renew your prescriptions, schedule appointments, and more. How Do I Sign Up? 1. In your internet browser, go to [email or web address]  2. Click on the First Time User? Click Here link in the Sign In box. You will be redirect to the New Member Sign Up page. 3. Enter your ItsOn Access Code exactly as it appears below. You will not need to use this code after youve completed the sign-up process. If you do not sign up before the expiration date, you must request a new code. ItsOn Access Code: [email or web address]  Expires: 2017  2:57 AM (This is the date your ItsOn access code will )    4. Enter the last four digits of your Social Security Number (xxxx) and Date of Birth (mm/dd/yyyy) as indicated and click Submit. You will be taken to the next sign-up page. 5. Create a ItsOn ID. This will be your ItsOn login ID and cannot be changed, so think of one that is secure and easy to remember. 6. Create a ItsOn password. You can change your password at any time. 7. Enter your Password Reset Question and Answer. This can be used at a later time if you forget your password. 8. Enter your e-mail address. You will receive e-mail notification when new information is available in [address]. 9. Click Sign Up. You can now view and download portions of your medical record. 10. Click the Download Summary menu link to download a portable copy of your medical information. Additional Information    If you have questions, please visit the Frequently Asked Questions section of the ItsOn website at [email or web address] Remember, ItsOn is NOT to be used for urgent needs. For medical emergencies, dial 911.          Pregnancy Precautions: Care Instructions  Your Care Instructions  There is no sure way to prevent labor before your due date ( labor) or to prevent most other pregnancy problems. But there are things you can do to increase your chances of a healthy pregnancy. Go to your appointments, follow your doctor's advice, and take good care of yourself. Eat well, and exercise (if your doctor agrees). And make sure to drink plenty of water. Follow-up care is a key part of your treatment and safety. Be sure to make and go to all appointments, and call your doctor if you are having problems. It's also a good idea to know your test results and keep a list of the medicines you take. How can you care for yourself at home? · Make sure you go to your prenatal appointments. At each visit, your doctor will check your blood pressure. Your doctor will also check to see if you have protein in your urine. High blood pressure and protein in urine are signs of preeclampsia. This condition can be dangerous for you and your baby. · Drink plenty of fluids, enough so that your urine is light yellow or clear like water. Dehydration can cause contractions. If you have kidney, heart, or liver disease and have to limit fluids, talk with your doctor before you increase the amount of fluids you drink. · Tell your doctor right away if you notice any symptoms of an infection, such as:  ¨ Burning when you urinate. ¨ A foul-smelling discharge from your vagina. ¨ Vaginal itching. ¨ Unexplained fever. ¨ Unusual pain or soreness in your uterus or lower belly. · Eat a balanced diet. Include plenty of foods that are high in calcium and iron. ¨ Foods high in calcium include milk, cheese, yogurt, almonds, and broccoli. ¨ Foods high in iron include red meat, shellfish, poultry, eggs, beans, raisins, whole-grain bread, and leafy green vegetables. · Do not smoke. If you need help quitting, talk to your doctor about stop-smoking programs and medicines.  These can increase your chances of quitting for good. · Do not drink alcohol or use illegal drugs. · Follow your doctor's directions about activity. Your doctor will let you know how much, if any, exercise you can do. · Ask your doctor if you can have sex. If you are at risk for early labor, your doctor may ask you to not have sex. · Take care to prevent falls. During pregnancy, your joints are loose, and your balance is off. Sports such as bicycling, skiing, or in-line skating can increase your risk of falling. And don't ride horses or motorcycles, dive, water ski, scuba dive, or parachute jump while you are pregnant. · Avoid getting very hot. Do not use saunas or hot tubs. Avoid staying out in the sun in hot weather for long periods. Take acetaminophen (Tylenol) to lower a high fever. · Do not take any over-the-counter or herbal medicines or supplements without talking to your doctor or pharmacist first.  When should you call for help? Call 911 anytime you think you may need emergency care. For example, call if:  · You passed out (lost consciousness). · You have severe vaginal bleeding. · You have severe pain in your belly or pelvis. · You have had fluid gushing or leaking from your vagina and you know or think the umbilical cord is bulging into your vagina. If this happens, immediately get down on your knees so your rear end (buttocks) is higher than your head. This will decrease the pressure on the cord until help arrives. Call your doctor now or seek immediate medical care if:  · You have signs of preeclampsia, such as:  ¨ Sudden swelling of your face, hands, or feet. ¨ New vision problems (such as dimness or blurring). ¨ A severe headache. · You have any vaginal bleeding. · You have belly pain or cramping. · You have a fever. · You have had regular contractions (with or without pain) for an hour.  This means that you have 8 or more within 1 hour or 4 or more in 20 minutes after you change your position and drink fluids. · You have a sudden release of fluid from your vagina. · You have low back pain or pelvic pressure that does not go away. · You notice that your baby has stopped moving or is moving much less than normal.  Watch closely for changes in your health, and be sure to contact your doctor if you have any problems. Where can you learn more? Go to http://glenn-alex.info/. Enter [id number] in the search box to learn more about \"Pregnancy Precautions: Care Instructions. \"  Current as of: May 30, 2016  Content Version: 11.2  © 4702-1235 [employer]. Care instructions adapted under license by [employer] (which disclaims liability or warranty for this information). If you have questions about a medical condition or this instruction, always ask your healthcare professional. [address] any warranty or liability for your use of this information.

## 2017-05-14 NOTE — H&P
Patient presented for labor evaluation. She was not in labor and was discharged home. Follow up as scheduled.      Cuco Rodriguez MD

## 2017-05-14 NOTE — IP AVS SNAPSHOT
Janny Foil 
 
 
 [address] 
[phone] Patient: Nakia Workman MRN: [id number] MQQ:[id number] You are allergic to the following No active allergies Recent Documentation Height Weight BMI OB Status Smoking Status 1.702 m 93.9 kg 32.42 kg/m2 Pregnant Never Smoker Emergency Contacts Name Discharge Info Relation Home Work Mobile Leighann Pacheco DISCHARGE CAREGIVER [3] Boyfriend [17] [phone] About your hospitalization You were admitted on:  N/A You last received care in the:  [facility] LABOR & DELIVERY You were discharged on:  May 14, 2017 Unit phone number:  [phone] Why you were hospitalized Your primary diagnosis was:  Not on File Providers Seen During Your Hospitalizations Provider Role Specialty Primary office phone Braxton Manzano MD Attending Provider Obstetrics & Gynecology [phone] Your Primary Care Physician (PCP) Primary Care Physician Office Phone Office Fax NONE ** None ** ** None ** Follow-up Information Follow up With Details Comments Contact Info None   None (395) Patient stated that they have no PCP Current Discharge Medication List  
  
ASK your doctor about these medications Dose & Instructions Dispensing Information Comments Morning Noon Evening Bedtime  
 albuterol 2.5 mg /3 mL (0.083 %) nebulizer solution Commonly known as:  PROVENTIL VENTOLIN Your last dose was: Your next dose is:    
   
   
 Dose:  2.5 mg  
3 mL by Nebulization route every four (4) hours as needed for Wheezing. Quantity:  24 Each Refills:  0  
     
   
   
   
  
 promethazine 12.5 mg tablet Commonly known as:  PHENERGAN Your last dose was: Your next dose is: Take  by mouth every eight (8) hours as needed for Nausea. Refills:  0 Discharge Instructions MyChart Activation Thank you for requesting access to [employer]. Please follow the instructions below to securely access and download your online medical record. [employer] allows you to send messages to your doctor, view your test results, renew your prescriptions, schedule appointments, and more. How Do I Sign Up? 1. In your internet browser, go to [email or web address] 
2. Click on the First Time User? Click Here link in the Sign In box. You will be redirect to the New Member Sign Up page. 3. Enter your [employer] Access Code exactly as it appears below. You will not need to use this code after youve completed the sign-up process. If you do not sign up before the expiration date, you must request a new code. [employer] Access Code: [email or web address] Expires: 2017  2:57 AM (This is the date your [employer] access code will ) 4. Enter the last four digits of your Social Security Number (xxxx) and Date of Birth (mm/dd/yyyy) as indicated and click Submit. You will be taken to the next sign-up page. 5. Create a [employer] ID. This will be your [employer] login ID and cannot be changed, so think of one that is secure and easy to remember. 6. Create a [employer] password. You can change your password at any time. 7. Enter your Password Reset Question and Answer. This can be used at a later time if you forget your password. 8. Enter your e-mail address. You will receive e-mail notification when new information is available in [address]. 9. Click Sign Up. You can now view and download portions of your medical record. 10. Click the Download Summary menu link to download a portable copy of your medical information. Additional Information If you have questions, please visit the Frequently Asked Questions section of the [employer] website at [email or web address] Remember, [employer] is NOT to be used for urgent needs.  For medical emergencies, dial 911. Pregnancy Precautions: Care Instructions Your Care Instructions There is no sure way to prevent labor before your due date ( labor) or to prevent most other pregnancy problems. But there are things you can do to increase your chances of a healthy pregnancy. Go to your appointments, follow your doctor's advice, and take good care of yourself. Eat well, and exercise (if your doctor agrees). And make sure to drink plenty of water. Follow-up care is a key part of your treatment and safety. Be sure to make and go to all appointments, and call your doctor if you are having problems. It's also a good idea to know your test results and keep a list of the medicines you take. How can you care for yourself at home? · Make sure you go to your prenatal appointments. At each visit, your doctor will check your blood pressure. Your doctor will also check to see if you have protein in your urine. High blood pressure and protein in urine are signs of preeclampsia. This condition can be dangerous for you and your baby. · Drink plenty of fluids, enough so that your urine is light yellow or clear like water. Dehydration can cause contractions. If you have kidney, heart, or liver disease and have to limit fluids, talk with your doctor before you increase the amount of fluids you drink. · Tell your doctor right away if you notice any symptoms of an infection, such as: ¨ Burning when you urinate. ¨ A foul-smelling discharge from your vagina. ¨ Vaginal itching. ¨ Unexplained fever. ¨ Unusual pain or soreness in your uterus or lower belly. · Eat a balanced diet. Include plenty of foods that are high in calcium and iron. ¨ Foods high in calcium include milk, cheese, yogurt, almonds, and broccoli. ¨ Foods high in iron include red meat, shellfish, poultry, eggs, beans, raisins, whole-grain bread, and leafy green vegetables. · Do not smoke.  If you need help quitting, talk to your doctor about stop-smoking programs and medicines. These can increase your chances of quitting for good. · Do not drink alcohol or use illegal drugs. · Follow your doctor's directions about activity. Your doctor will let you know how much, if any, exercise you can do. · Ask your doctor if you can have sex. If you are at risk for early labor, your doctor may ask you to not have sex. · Take care to prevent falls. During pregnancy, your joints are loose, and your balance is off. Sports such as bicycling, skiing, or in-line skating can increase your risk of falling. And don't ride horses or motorcycles, dive, water ski, scuba dive, or parachute jump while you are pregnant. · Avoid getting very hot. Do not use saunas or hot tubs. Avoid staying out in the sun in hot weather for long periods. Take acetaminophen (Tylenol) to lower a high fever. · Do not take any over-the-counter or herbal medicines or supplements without talking to your doctor or pharmacist first. 
When should you call for help? Call 911 anytime you think you may need emergency care. For example, call if: 
· You passed out (lost consciousness). · You have severe vaginal bleeding. · You have severe pain in your belly or pelvis. · You have had fluid gushing or leaking from your vagina and you know or think the umbilical cord is bulging into your vagina. If this happens, immediately get down on your knees so your rear end (buttocks) is higher than your head. This will decrease the pressure on the cord until help arrives. Call your doctor now or seek immediate medical care if: 
· You have signs of preeclampsia, such as: 
¨ Sudden swelling of your face, hands, or feet. ¨ New vision problems (such as dimness or blurring). ¨ A severe headache. · You have any vaginal bleeding. · You have belly pain or cramping. · You have a fever. · You have had regular contractions (with or without pain) for an hour. This means that you have 8 or more within 1 hour or 4 or more in 20 minutes after you change your position and drink fluids. · You have a sudden release of fluid from your vagina. · You have low back pain or pelvic pressure that does not go away. · You notice that your baby has stopped moving or is moving much less than normal. 
Watch closely for changes in your health, and be sure to contact your doctor if you have any problems. Where can you learn more? Go to http://glenn-alex.info/. Enter [id number] in the search box to learn more about \"Pregnancy Precautions: Care Instructions. \" Current as of: May 30, 2016 Content Version: 11.2 © 6090-0010 [employer]. Care instructions adapted under license by [employer] (which disclaims liability or warranty for this information). If you have questions about a medical condition or this instruction, always ask your healthcare professional. [address] any warranty or liability for your use of this information. Discharge Orders None Introducing [facility]! Stephanie Monaco introduces [employer] patient portal. Now you can access parts of your medical record, email your doctor's office, and request medication refills online. 1. In your internet browser, go to [email or web address] 2. Click on the First Time User? Click Here link in the Sign In box. You will see the New Member Sign Up page. 3. Enter your [employer] Access Code exactly as it appears below. You will not need to use this code after youve completed the sign-up process. If you do not sign up before the expiration date, you must request a new code. · [employer] Access Code: [email or web address] Expires: 8/12/2017  2:57 AM 
 
4. Enter the last four digits of your Social Security Number (xxxx) and Date of Birth (mm/dd/yyyy) as indicated and click Submit. You will be taken to the next sign-up page. 5. Create a [employer] ID. This will be your [employer] login ID and cannot be changed, so think of one that is secure and easy to remember. 6. Create a [employer] password. You can change your password at any time. 7. Enter your Password Reset Question and Answer. This can be used at a later time if you forget your password. 8. Enter your e-mail address. You will receive e-mail notification when new information is available in [address] 9. Click Sign Up. You can now view and download portions of your medical record. 10. Click the Download Summary menu link to download a portable copy of your medical information. If you have questions, please visit the Frequently Asked Questions section of the [employer] website. Remember, [employer] is NOT to be used for urgent needs. For medical emergencies, dial 911. Now available from your iPhone and Android! General Information Please provide this summary of care documentation to your next provider. Patient Signature:  ____________________________________________________________ Date:  ____________________________________________________________  
  
Raina Manzano Provider Signature:  ____________________________________________________________ Date:  ____________________________________________________________

## 2017-05-19 ENCOUNTER — HOSPITAL ENCOUNTER (INPATIENT)
Age: 37
LOS: 1 days | Discharge: HOME | DRG: 560 | End: 2017-05-20
Attending: OBSTETRICS & GYNECOLOGY | Admitting: OBSTETRICS & GYNECOLOGY
Payer: COMMERCIAL

## 2017-05-19 PROBLEM — Z34.90 PREGNANCY: Status: ACTIVE | Noted: 2017-05-19

## 2017-05-19 LAB
ERYTHROCYTE [DISTWIDTH] IN BLOOD BY AUTOMATED COUNT: 15.3 % (ref 11.5–14.5)
HCT VFR BLD AUTO: 34.3 % (ref 35–47)
HGB BLD-MCNC: 11.2 G/DL (ref 11.5–16)
MCH RBC QN AUTO: 27.6 PG (ref 26–34)
MCHC RBC AUTO-ENTMCNC: 32.7 G/DL (ref 30–36.5)
MCV RBC AUTO: 84.5 FL (ref 80–99)
PLATELET # BLD AUTO: 276 K/UL (ref 150–400)
RBC # BLD AUTO: 4.06 M/UL (ref 3.8–5.2)
WBC # BLD AUTO: 8.9 K/UL (ref 3.6–11)

## 2017-05-19 PROCEDURE — 88307 TISSUE EXAM BY PATHOLOGIST: CPT | Performed by: OBSTETRICS & GYNECOLOGY

## 2017-05-19 PROCEDURE — 75410000000 HC DELIVERY VAGINAL/SINGLE: Performed by: OBSTETRICS & GYNECOLOGY

## 2017-05-19 PROCEDURE — 77030018749 HC HK AMNIO DISP DERY -A

## 2017-05-19 PROCEDURE — 75410000002 HC LABOR FEE PER 1 HR: Performed by: OBSTETRICS & GYNECOLOGY

## 2017-05-19 PROCEDURE — 74011250636 HC RX REV CODE- 250/636: Performed by: OBSTETRICS & GYNECOLOGY

## 2017-05-19 PROCEDURE — 75410000003 HC RECOV DEL/VAG/CSECN EA 0.5 HR: Performed by: OBSTETRICS & GYNECOLOGY

## 2017-05-19 PROCEDURE — 74011250636 HC RX REV CODE- 250/636: Performed by: ANESTHESIOLOGY

## 2017-05-19 PROCEDURE — 10907ZC DRAINAGE OF AMNIOTIC FLUID, THERAPEUTIC FROM PRODUCTS OF CONCEPTION, VIA NATURAL OR ARTIFICIAL OPENING: ICD-10-PCS | Performed by: OBSTETRICS & GYNECOLOGY

## 2017-05-19 PROCEDURE — 65270000029 HC RM PRIVATE

## 2017-05-19 PROCEDURE — 77010026065 HC OXYGEN MINIMUM MEDICAL AIR: Performed by: OBSTETRICS & GYNECOLOGY

## 2017-05-19 PROCEDURE — 36415 COLL VENOUS BLD VENIPUNCTURE: CPT | Performed by: OBSTETRICS & GYNECOLOGY

## 2017-05-19 PROCEDURE — 76060000078 HC EPIDURAL ANESTHESIA: Performed by: ANESTHESIOLOGY

## 2017-05-19 PROCEDURE — 74011250637 HC RX REV CODE- 250/637: Performed by: OBSTETRICS & GYNECOLOGY

## 2017-05-19 PROCEDURE — 77030034850

## 2017-05-19 PROCEDURE — 85027 COMPLETE CBC AUTOMATED: CPT | Performed by: OBSTETRICS & GYNECOLOGY

## 2017-05-19 PROCEDURE — 3E033VJ INTRODUCTION OF OTHER HORMONE INTO PERIPHERAL VEIN, PERCUTANEOUS APPROACH: ICD-10-PCS | Performed by: OBSTETRICS & GYNECOLOGY

## 2017-05-19 RX ORDER — IBUPROFEN 800 MG/1
800 TABLET ORAL EVERY 8 HOURS
Status: DISCONTINUED | OUTPATIENT
Start: 2017-05-19 | End: 2017-05-20 | Stop reason: HOSPADM

## 2017-05-19 RX ORDER — NALOXONE HYDROCHLORIDE 0.4 MG/ML
0.4 INJECTION, SOLUTION INTRAMUSCULAR; INTRAVENOUS; SUBCUTANEOUS AS NEEDED
Status: DISCONTINUED | OUTPATIENT
Start: 2017-05-19 | End: 2017-05-20 | Stop reason: HOSPADM

## 2017-05-19 RX ORDER — SODIUM CHLORIDE 0.9 % (FLUSH) 0.9 %
5-10 SYRINGE (ML) INJECTION EVERY 8 HOURS
Status: DISCONTINUED | OUTPATIENT
Start: 2017-05-19 | End: 2017-05-19

## 2017-05-19 RX ORDER — NALOXONE HYDROCHLORIDE 0.4 MG/ML
0.4 INJECTION, SOLUTION INTRAMUSCULAR; INTRAVENOUS; SUBCUTANEOUS ONCE
Status: DISCONTINUED | OUTPATIENT
Start: 2017-05-19 | End: 2017-05-19

## 2017-05-19 RX ORDER — OXYTOCIN/RINGER'S LACTATE 20/1000 ML
125-500 PLASTIC BAG, INJECTION (ML) INTRAVENOUS ONCE
Status: ACTIVE | OUTPATIENT
Start: 2017-05-19 | End: 2017-05-20

## 2017-05-19 RX ORDER — DIPHENHYDRAMINE HCL 25 MG
25 CAPSULE ORAL
Status: DISCONTINUED | OUTPATIENT
Start: 2017-05-19 | End: 2017-05-20 | Stop reason: HOSPADM

## 2017-05-19 RX ORDER — SODIUM CHLORIDE, SODIUM LACTATE, POTASSIUM CHLORIDE, CALCIUM CHLORIDE 600; 310; 30; 20 MG/100ML; MG/100ML; MG/100ML; MG/100ML
125 INJECTION, SOLUTION INTRAVENOUS CONTINUOUS
Status: DISCONTINUED | OUTPATIENT
Start: 2017-05-19 | End: 2017-05-19

## 2017-05-19 RX ORDER — NALOXONE HYDROCHLORIDE 0.4 MG/ML
0.4 INJECTION, SOLUTION INTRAMUSCULAR; INTRAVENOUS; SUBCUTANEOUS AS NEEDED
Status: DISCONTINUED | OUTPATIENT
Start: 2017-05-19 | End: 2017-05-19

## 2017-05-19 RX ORDER — FENTANYL/BUPIVACAINE/NS/PF 2-1250MCG
1-16 PREFILLED PUMP RESERVOIR EPIDURAL CONTINUOUS
Status: DISCONTINUED | OUTPATIENT
Start: 2017-05-19 | End: 2017-05-19

## 2017-05-19 RX ORDER — HYDROMORPHONE HYDROCHLORIDE 1 MG/ML
1 INJECTION, SOLUTION INTRAMUSCULAR; INTRAVENOUS; SUBCUTANEOUS
Status: DISCONTINUED | OUTPATIENT
Start: 2017-05-19 | End: 2017-05-20 | Stop reason: HOSPADM

## 2017-05-19 RX ORDER — SIMETHICONE 80 MG
80 TABLET,CHEWABLE ORAL
Status: DISCONTINUED | OUTPATIENT
Start: 2017-05-19 | End: 2017-05-20 | Stop reason: HOSPADM

## 2017-05-19 RX ORDER — PROMETHAZINE HYDROCHLORIDE 25 MG/1
12.5 TABLET ORAL
Status: DISCONTINUED | OUTPATIENT
Start: 2017-05-19 | End: 2017-05-20 | Stop reason: HOSPADM

## 2017-05-19 RX ORDER — HYDROCORTISONE ACETATE PRAMOXINE HCL 2.5; 1 G/100G; G/100G
CREAM TOPICAL AS NEEDED
Status: DISCONTINUED | OUTPATIENT
Start: 2017-05-19 | End: 2017-05-20 | Stop reason: HOSPADM

## 2017-05-19 RX ORDER — SODIUM CHLORIDE 0.9 % (FLUSH) 0.9 %
5-10 SYRINGE (ML) INJECTION AS NEEDED
Status: DISCONTINUED | OUTPATIENT
Start: 2017-05-19 | End: 2017-05-19

## 2017-05-19 RX ORDER — OXYTOCIN IN 5 % DEXTROSE 30/500 ML
.5-2 PLASTIC BAG, INJECTION (ML) INTRAVENOUS
Status: DISCONTINUED | OUTPATIENT
Start: 2017-05-19 | End: 2017-05-19

## 2017-05-19 RX ORDER — LIDOCAINE HYDROCHLORIDE 10 MG/ML
20 INJECTION INFILTRATION; PERINEURAL ONCE
Status: DISCONTINUED | OUTPATIENT
Start: 2017-05-19 | End: 2017-05-19

## 2017-05-19 RX ORDER — OXYCODONE AND ACETAMINOPHEN 5; 325 MG/1; MG/1
1 TABLET ORAL
Status: DISCONTINUED | OUTPATIENT
Start: 2017-05-19 | End: 2017-05-20 | Stop reason: HOSPADM

## 2017-05-19 RX ORDER — VALACYCLOVIR HYDROCHLORIDE 500 MG/1
500 TABLET, FILM COATED ORAL 2 TIMES DAILY
COMMUNITY
End: 2020-03-19

## 2017-05-19 RX ORDER — ONDANSETRON 2 MG/ML
4 INJECTION INTRAMUSCULAR; INTRAVENOUS
Status: DISCONTINUED | OUTPATIENT
Start: 2017-05-19 | End: 2017-05-20 | Stop reason: HOSPADM

## 2017-05-19 RX ADMIN — OXYCODONE HYDROCHLORIDE AND ACETAMINOPHEN 1 TABLET: 5; 325 TABLET ORAL at 20:29

## 2017-05-19 RX ADMIN — IBUPROFEN 800 MG: 800 TABLET, FILM COATED ORAL at 20:29

## 2017-05-19 RX ADMIN — PROMETHAZINE HYDROCHLORIDE 12.5 MG: 25 TABLET ORAL at 13:14

## 2017-05-19 RX ADMIN — SODIUM CHLORIDE, SODIUM LACTATE, POTASSIUM CHLORIDE, AND CALCIUM CHLORIDE 125 ML/HR: 600; 310; 30; 20 INJECTION, SOLUTION INTRAVENOUS at 10:31

## 2017-05-19 RX ADMIN — IBUPROFEN 800 MG: 800 TABLET, FILM COATED ORAL at 13:14

## 2017-05-19 RX ADMIN — FENTANYL 0.2 MG/100ML-BUPIV 0.125%-NACL 0.9% EPIDURAL INJ 12 ML/HR: 2/0.125 SOLUTION at 09:37

## 2017-05-19 RX ADMIN — Medication 4 MILLI-UNITS/MIN: at 10:29

## 2017-05-19 RX ADMIN — Medication 2 MILLI-UNITS/MIN: at 07:45

## 2017-05-19 RX ADMIN — SODIUM CHLORIDE, SODIUM LACTATE, POTASSIUM CHLORIDE, AND CALCIUM CHLORIDE 1000 ML: 600; 310; 30; 20 INJECTION, SOLUTION INTRAVENOUS at 08:00

## 2017-05-19 RX ADMIN — SODIUM CHLORIDE, SODIUM LACTATE, POTASSIUM CHLORIDE, AND CALCIUM CHLORIDE 999 ML/HR: 600; 310; 30; 20 INJECTION, SOLUTION INTRAVENOUS at 08:48

## 2017-05-19 NOTE — PROGRESS NOTES
Received SBAR report from Shadia Martinez RN, with bedside handoff. TRANSFER - OUT REPORT:    Verbal report given to Mireille Haywood RN (name) on Grazyna Whipple  being transferred to [facility] room 310   (unit) for routine progression of care       Report consisted of patients Situation, Background, Assessment and   Recommendations(SBAR). Infant bands verified against record of birth with receiving RN. Parents encouraged to read along. Information from the following report(s) SBAR, Intake/Output, MAR and Med Rec Status was reviewed with the receiving nurse. Lines:       Opportunity for questions and clarification was provided.       Patient transported with:   Registered Nurse

## 2017-05-19 NOTE — L&D DELIVERY NOTE
Delivery Summary    Patient: Mickey Yañez MRN: [id number]  SSN: [id number]    YOB: 1980  Age: 30 y.o. Sex: female        Labor Events:    Labor:      Rupture Date: 2017    Rupture Time: 8:38 AM    Rupture Type AROM    Amniotic Fluid Volume:       Amniotic Fluid Description: Clear  None    Induction: AROM; Oxytocin         Augmentation: None    Labor Complications: Additional Complications:        Cervical Ripening:              Delivery Events:  Episiotomy:      Laceration(s):         Repaired:       Number of Repair Packets:      Suture Type and Size:          Estimated Blood Loss (ml):          Information for the patient's :  Claudell Binder [[id number]]     Delivery Summary - Baby    Delivery Date: 2017   Delivery Time: 11:59 AM   Delivery Type: Vaginal, Spontaneous Delivery  Sex:  unknown  Gestational Age: 41w4d  Delivery Clinician:  Cristiano Le  Living?: Yes   Delivery Location: L&D             APGARS  One minute Five minutes Ten minutes   Skin Color: 1    1       Heart Rate: 2   2         Reflex Irritability: 2   2         Muscle Tone: 2   2       Respiration: 2   2         Total: 9   9           Presentation: Vertex  Position: Right Occiput Anterior  Resuscitation Method:  Tactile Stimulation;Suctioning-bulb     Meconium Stained: None    Cord Information: 3 Vessels   Complications: None  Cord Blood Sent?:  Yes    Blood Gases Sent?:  No    Placenta:  Date/Time:  12:04 PM  Removal: Spontaneous      Appearance: Other (comment)      Measurements:  Birth Weight:      Birth Length:     Head Circumference:       Chest Circumference:      Abdominal Girth: Other Providers:   THAD Loredo;Jasvir HUGHES Obstetrician;Primary Nurse;Primary  Nurse; Anesthesiologist         Delivery Note:     Patient reached FD and pushed with good effort to deliver the fetal head in MAIRA position. No nuchal cord found. The anterior shoulder, followed by the posterior shoulder and the rest of the body then delivered easily. This was a VFI with Apgars of 9 and 9 at 1 and 5 minutes respectively, weight pending. The infant was bulb suctioned and placed on mom's abdomen. The cord was then double clamped and cut by the FOB. Cord blood and cord segment were taken. The placenta followed spontaneously, intact, with 3VC. Pitocin was added to the IVF and the fundus was firm to palpation. The vagina, cervix and perineum were examined and no laceration was found.  mL. No complications. Mom and baby doing well. Dr. Sanches Living delivering.      Didi Miguel MD  [facility]

## 2017-05-19 NOTE — DISCHARGE SUMMARY
Obstetrical Discharge Summary     Name: Imelda Knowles MRN: [id number]  SSN: [id number]    YOB: 1980  Age: 30 y.o. Sex: female      Admit Date: 2017    Discharge Date: 17     Attending Physician:  Joby Anderson MD     Delivering Physician:  Joby Anderson MD     * Admission Diagnoses:   IUP @ 40w2d         * Discharge Diagnoses:   Information for the patient's :  Hammad Hernandez [[id number]]   Delivery of a  Viable female infant via Vaginal, Spontaneous Delivery on 2017 at 11:59 AM  By Madison Schulte MD . Apgars were 9 and 9. Intact perineum       Additional Diagnoses:   Hospital Problems as of 2017  Never Reviewed          Codes Class Noted - Resolved POA    Pregnancy ICD-10-CM: Z33.1  ICD-9-CM: V22.2  2017 - Present Unknown             Lab Results   Component Value Date/Time    Rubella, External Immune 10/05/2016    GrBStrep, External Negative 2017      There is no immunization history on file for this patient. * Procedures:   IOL             * Discharge Condition: good    * Hospital Course: Normal hospital course following the delivery. * Disposition: Home    Discharge Medications:   Current Discharge Medication List          * Follow-up Care/Patient Instructions:   Activity: Activity as tolerated  Diet: Regular Diet  Wound Care: As directed      Followup 6 weeks for PP check        Signed By:  Joby Adnerson MD     May 19, 2017

## 2017-05-19 NOTE — IP AVS SNAPSHOT
Current Discharge Medication List  
  
START taking these medications Dose & Instructions Dispensing Information Comments Morning Noon Evening Bedtime  
 oxyCODONE-acetaminophen 5-325 mg per tablet Commonly known as:  PERCOCET Your last dose was: Your next dose is:    
   
   
 Dose:  1 Tab Take 1 Tab by mouth every six (6) hours as needed. Max Daily Amount: 4 Tabs. Quantity:  6 Tab Refills:  0 CONTINUE these medications which have NOT CHANGED Dose & Instructions Dispensing Information Comments Morning Noon Evening Bedtime  
 albuterol 2.5 mg /3 mL (0.083 %) nebulizer solution Commonly known as:  PROVENTIL VENTOLIN Your last dose was: Your next dose is:    
   
   
 Dose:  2.5 mg  
3 mL by Nebulization route every four (4) hours as needed for Wheezing. Quantity:  24 Each Refills:  0  
     
   
   
   
  
 PNV No12-Iron-FA-DSS-OM-3 29 mg iron-1 mg -50 mg Cpkd Your last dose was: Your next dose is:    
   
   
 Dose:  1 Tab Take 1 Tab by mouth daily. Indications: Pregnancy Refills:  0 VALTREX 500 mg tablet Generic drug:  valACYclovir Your last dose was: Your next dose is:    
   
   
 Dose:  500 mg Take 500 mg by mouth two (2) times a day. Indications: HERPES ZOSTER Refills:  0 STOP taking these medications   
 promethazine 12.5 mg tablet Commonly known as:  PHENERGAN Where to Get Your Medications Information on where to get these meds will be given to you by the nurse or doctor. ! Ask your nurse or doctor about these medications  
  oxyCODONE-acetaminophen 5-325 mg per tablet

## 2017-05-19 NOTE — LACTATION NOTE
Problem: Lactation Care Plan  Goal: *Infant latching appropriately  Outcome: Progressing Towards Goal  Placed baby skin to skin with mom, in Cross cradle position, Drops of colustrum expressed, latched after several attempts.      Pt will successfully establish breastfeeding by feeding in response to early feeding cues   or wake every 3h, will obtain deep latch, and will keep log of feedings/output. Taught to BF at hunger cues and or q 2-3 hrs and to offer 10-20 drops of hand expressed colostrum at any non-feeds.       Breast Assessment  Left Breast: Large  Left Nipple: Everted, Intact  Right Breast: Large  Right Nipple: Everted, Intact  Breast- Feeding Assessment  Attends Breast-Feeding Classes: No  Breast-Feeding Experience: Yes (nursed lst 3 children longest for 1 month)  Breast Trauma/Surgery: No  Type/Quality: Attempted  Lactation Consultant Visits  Breast-Feedings: Attempted breast-feeding  Mother/Infant Observation  Mother Observation: Alignment, Breast comfortable, Close hold, Holds breast, Lets baby end feeding  Infant Observation: Latches nipple and aereolae, Lips flanged, lower, Lips flanged, upper, Opens mouth  LATCH Documentation  Latch: Repeated attempts, hold nipple in mouth, stimulate to suck  Audible Swallowing: A few with stimulation  Type of Nipple: Everted (after stimulation)  Comfort (Breast/Nipple): Soft/non-tender  Hold (Positioning): Full assist, teach one side, mother does other, staff holds  LATCH Score: 7          Goal: *Weight loss less than 10% of birth weight  Outcome: Progressing Towards Goal  Reviewed breastfeeding basics: Supply and demand,  stomach size, early Feeding cues, skin to skin, positioning and baby led latch-on, assymetrical latch with signs of good, deep latch vs shallow, feeding frequency and duration, and log sheet for tracking infant feedings and output. Breastfeeding Booklet and Warm line information given.  Discussed typical  weight loss and the importance of infant weight checks with pediatrician 1-2 post discharge.      Mom agrees with plan.      Problem: Patient Education: Go to Patient Education Activity  Goal: Patient/Family Education  Outcome: Progressing Towards Goal  Reviewed breastfeeding basics: Supply and demand,  stomach size, early Feeding cues, skin to skin, positioning and baby led latch-on, assymetrical latch with signs of good, deep latch vs shallow, feeding frequency and duration, and log sheet for tracking infant feedings and output. Breastfeeding Booklet and Warm line information given. Discussed typical  weight loss and the importance of infant weight checks with pediatrician 1-2 post discharge.      Hand Expression Education: Mom taught how to manually hand express her colostrum. Emphasized the importance of providing infant with valuable colostrum as infant rests skin to skin at breast. Aware to avoid extended periods of non-feeding. Aware to offer 10-20+ drops of colostrum every 2-3 hours until infant is latching and nursing effectively. Taught the rationale behind this low tech but highly effective evidence based practice.      Discussed with mother her plan for feeding. Reviewed the benefits of exclusive breast milk feeding during the hospital stay. Informed her of the risks of using formula to supplement in the first few days of life as well as the benefits of successful breast milk feeding; referred her to the Breastfeeding booklet about this information. She acknowledges understanding of information reviewed and states that it is her plan to breast feed for her infant.  Will support her choice and offer additional information as needed.

## 2017-05-19 NOTE — H&P
History & Physical    Name: Annamarie Mcneill MRN: [id number]  SSN: [id number]    YOB: 1980  Age: 30 y.o. Sex: female      Subjective:     Estimated Date of Delivery: 17  OB History    Para Term  AB SAB TAB Ectopic Multiple Living   4 3 3       3      # Outcome Date GA Lbr Jj/2nd Weight Sex Delivery Anes PTL Lv   4 Current            3 Term     M  EPI N Y   2 Term     M Vag-Spont EPI N Y   1 Term     F Vag-Spont EPI N Y          Ms. Lucinda Gutiérrez is admitted with pregnancy at 40w2d for induction of labor due to favorable cervix at term. Prenatal course was normal.  Please see prenatal records for details. Past Medical History:   Diagnosis Date    Asthma     as a child    Genital herpes     denies current outbreak     History reviewed. No pertinent surgical history. Social History     Occupational History    Not on file. Social History Main Topics    Smoking status: Never Smoker    Smokeless tobacco: Not on file    Alcohol use No    Drug use: No    Sexual activity: Yes     Partners: Male     Birth control/ protection: None     Family History   Problem Relation Age of Onset    Asthma Mother     Asthma Brother        No Known Allergies  Prior to Admission medications    Medication Sig Start Date End Date Taking? Authorizing Provider   PNV No12-Iron-FA-DSS-OM-3 29 mg iron-1 mg -50 mg CPKD Take 1 Tab by mouth daily. Indications: Pregnancy   Yes Historical Provider   valACYclovir (VALTREX) 500 mg tablet Take 500 mg by mouth two (2) times a day. Indications: HERPES ZOSTER   Yes Historical Provider   albuterol (PROVENTIL VENTOLIN) 2.5 mg /3 mL (0.083 %) nebulizer solution 3 mL by Nebulization route every four (4) hours as needed for Wheezing. 17  Yes Jh Alicea MD   promethazine (PHENERGAN) 12.5 mg tablet Take  by mouth every eight (8) hours as needed for Nausea.     Historical Provider        Review of Systems: A comprehensive review of systems was negative except for that written in the History of Present Illness. Objective:     Vitals:  Vitals:    17 0652 17 0657 17 0722 17 0723   BP:    160/76   Pulse:    69   Resp:    16   Temp:    97.8 °F (36.6 °C)   SpO2: 99% 100% 99%    Weight:       Height:            Physical Exam:  Patient without distress. Heart: Regular rate and rhythm  Lung: normal respiratory effort  Abdomen: soft, nontender  Fundus: soft and non tender  Perineum: blood absent, amniotic fluid absent  Cervical Exam: 3/50/-2; vtx, post.    Lower Extremities: WWP   Membranes:  Artificial Rupture of Membranes; Amniotic Fluid: small amount of clear fluid  Fetal Heart Rate: Reactive          Prenatal Labs:   Lab Results   Component Value Date/Time    Rubella, External Immune 10/05/2016    HBsAg, External negative 10/05/2016    HIV, External negative 10/05/2016    RPR, External Non-Reactive 10/05/2016    Gonorrhea, External negative 2016    Chlamydia, External negative 2016    ABO,Rh O positive 10/05/2016    GrBStrep, External Negative 2017       Impression/Plan:     Active Problems:    Pregnancy (2017)         Plan: Admit for induction of labor. Group B Strep negative.   Pit per protocol   CEFM/Ovid  Consented  IVF/Clears  Pain management if desired   Expect      Signed By:  Sheryl Weaver MD     May 19, 2017

## 2017-05-19 NOTE — ROUTINE PROCESS
SBAR IN Report: Mother    Verbal report received from [facility] [address] (full name & credentials) on this patient, who is now being transferred from L&D (unit) for routine progression of care. Report consisted of patient's Situation, Background, Assessment and Recommendations (SBAR).  ID bands were compared with the identification form, and verified with the patient and transferring nurse. Information from the SBAR, Kardex, Procedure Summary, Intake/Output, MAR, Accordion and Recent Results and the Effie Report was reviewed with the transferring nurse; opportunity for questions and clarification provided.

## 2017-05-19 NOTE — PROGRESS NOTES
1880- pt arrived on unit for IOL. 40 weeks 2 days gestation. . Pt stated that she has been abhishek for several days and declines need for medication intervention at this time. Consents reviewed and signed,    0700-  Bedside and Verbal shift change report given to Ayleen Aguilar RN (oncoming nurse) by Michelle Edmondson RN (offgoing nurse). Report included the following information SBAR, Intake/Output, MAR, Recent Results and Med Rec Status.

## 2017-05-19 NOTE — PROGRESS NOTES
121 [facility] Notified Dr. Alvarez Silence of complete cervical exam done by primary RN. MD will come to room for delivery.

## 2017-05-19 NOTE — IP AVS SNAPSHOT
[address] 
[phone] Patient: Jimi Veliz MRN: [id number] WFP:[birth year] You are allergic to the following No active allergies Recent Documentation Height Weight Breastfeeding? BMI OB Status Smoking Status 1.702 m 95.3 kg Unknown 32.89 kg/m2 Recent pregnancy Never Smoker Unresulted Labs Order Current Status SAMPLE TO BLOOD BANK In process Emergency Contacts Name Discharge Info Relation Home Work Mobile Leighann Pacheco DISCHARGE CAREGIVER [3] Boyfriend [17]   [phone] About your hospitalization You were admitted on:  May 19, 2017 You last received care in the:  [facility] You were discharged on:  May 20, 2017 Unit phone number:  [phone] Why you were hospitalized Your primary diagnosis was:  Not on File Your diagnoses also included:  Pregnancy Providers Seen During Your Hospitalizations Provider Role Specialty Primary office phone Simon Smith MD Attending Provider Obstetrics & Gynecology [phone] Your Primary Care Physician (PCP) Primary Care Physician Office Phone Office Fax NONE ** None ** ** None ** Follow-up Information Follow up With Details Comments Contact Info None   None (395) Patient stated that they have no PCP Current Discharge Medication List  
  
START taking these medications Dose & Instructions Dispensing Information Comments Morning Noon Evening Bedtime  
 oxyCODONE-acetaminophen 5-325 mg per tablet Commonly known as:  PERCOCET Your last dose was: Your next dose is:    
   
   
 Dose:  1 Tab Take 1 Tab by mouth every six (6) hours as needed. Max Daily Amount: 4 Tabs. Quantity:  6 Tab Refills:  0 CONTINUE these medications which have NOT CHANGED Dose & Instructions Dispensing Information Comments Morning Noon Evening Bedtime  
 albuterol 2.5 mg /3 mL (0.083 %) nebulizer solution Commonly known as:  PROVENTIL VENTOLIN Your last dose was: Your next dose is:    
   
   
 Dose:  2.5 mg  
3 mL by Nebulization route every four (4) hours as needed for Wheezing. Quantity:  24 Each Refills:  0  
     
   
   
   
  
 PNV No12-Iron-FA-DSS-OM-3 29 mg iron-1 mg -50 mg Cpkd Your last dose was: Your next dose is:    
   
   
 Dose:  1 Tab Take 1 Tab by mouth daily. Indications: Pregnancy Refills:  0 VALTREX 500 mg tablet Generic drug:  valACYclovir Your last dose was: Your next dose is:    
   
   
 Dose:  500 mg Take 500 mg by mouth two (2) times a day. Indications: HERPES ZOSTER Refills:  0 STOP taking these medications   
 promethazine 12.5 mg tablet Commonly known as:  PHENERGAN Where to Get Your Medications Information on where to get these meds will be given to you by the nurse or doctor. ! Ask your nurse or doctor about these medications  
  oxyCODONE-acetaminophen 5-325 mg per tablet Discharge Instructions Discharge Instructions for Vaginal Delivery Patient ID: 
Aiden Fermin 
[id number] 
39 y.o. 
1980 Take Home Medications Continue taking your prenatal vitamins if you are breastfeeding. Follow-up care is a key part of your treatment and safety. Please schedule and keep appointments. Follow-up with your primary OB in 6 weeks. Activity Avoid anything in your vagina for 6 weeks (no intercourse, tampons, or douching). You may drive unless you are taking prescription pain medications. Climbing stairs and light lifting are okay. Please avoid excessive exercise, though walking is okay- you'll be tired! Diet Regular diet as tolerated.   Be sure to drink plenty of fluids if you are breastfeeding. Wound care If you have stitches, continue to rinse with a squirt bottle of warm water each time you void for about 7-10 days. .  Your stitches will gradually dissolve over four to eight weeks. Sitz baths are also helpful to keep the wound clean, encourage healing, and to help with pain associated with the stitches or hemorrhoids. You can use either a sitz bath basin or a bathtub filled with 2-3\" inches of plain warm water. Soak for 10 minutes 3 times a day as tolerated. Pain Management 1. Over the counter medications such as Tylenol and ibuprofen (Motrin or Advil) are ideal.  These may be taken together, alternating doses. You may  take the maximum dose:  Motrin or Advil (generic ibuprofen), either 3 tablets every 6 hours or 4 tablets every 8 hours or Tylenol (acetominophen) 1000mg every 6 hours (equivalent to 2 extra strength Tylenol). 2. You may also have a precrescription for stronger pain medication. Take only as needed and transition to over the counter medication in the next few days. Minimize amounts of the prescription medication, as it can be habit-forming and will worsen or cause constipation. Most patients will find that within a couple of days, their pain is adequately controlled using only over-the-counter medications. 3. The prescription pain medication is mixed with Tylenol, therefore, you should not take any extra Tylenol or acetaminophen until you have reduced your prescription pain medication. 4. Add heating pad or sitz baths as needed. Add hemorrhoid wipes or ointments if needed Constipation 1. Constipation is normal after pregnancy and delivery, especially while taking prescription narcotic pain medication. 2. Over the counter remedies including ducosate (Colace), take 1-2 capsules 1-2 times daily for soft stool as needed. You may also add/ try milk of magnesia or rectal remedies such as Dulcolax or Fleets enema. Recovery: What to Expect at [facility] 1. Fatigue is expected. Try to rest when you can and don't worry about doing housework or other tasks which can wait. 2. The soreness along your bottom will improve significantly over the first 2 weeks, but it may take 6 weeks before you are completely recovered. 3. Back pain or general body aches or muscle soreness are expected and should improve with acetominophen or ibuprofen. 4. Leg swelling due to pregnancy and/or IV fluids given in the hospital will take about two weeks to resolve. 5. Most women experience some form of the \"Baby Blues\" after having a baby. Feeling emotional, tearful, frustrated, anxious, sad, and irritable some of the time is normal and go away after about 2 weeks. Adequate rest and help from your family will help. Take breaks from caring for the baby. Call your doctor if your symptoms seem severe, last more than 2 weeks, or seem to be getting worse instead of better. Get help immediately if you have thoughts of wanting to hurt yourself or others! Call your doctor or seek immediate medical care if you have: 
Heavy vaginal bleeding, soaking through one or more pads an hour for several hours. Foul-smelling discharge from your vagina or incision. Consistent nausea and vomiting and cannot keep fluids down. Consistent pain that does not get better after you take pain medicine. Sudden chest pain and shortness of breath Signs of a blood clot: pain/ swelling/ increasing redness in your lower extremeties Signs of infection: increased pain in your abdomen or vaginal area; red streaks, warmth, or tenderness of your breasts; fever of 100.5 F or greater Discharge Orders None [employer] Announcement We are excited to announce that we are making your provider's discharge notes available to you in [facility].   You will see these notes when they are completed and signed by the physician that discharged you from your recent hospital stay. If you have any questions or concerns about any information you see in [employer], please call the Health Information Department where you were seen or reach out to your Primary Care Provider for more information about your plan of care. Introducing [facility]! Lana Nam introduces [employer] patient portal. Now you can access parts of your medical record, email your doctor's office, and request medication refills online. 1. In your internet browser, go to [email or web address] 2. Click on the First Time User? Click Here link in the Sign In box. You will see the New Member Sign Up page. 3. Enter your [employer] Access Code exactly as it appears below. You will not need to use this code after youve completed the sign-up process. If you do not sign up before the expiration date, you must request a new code. · [employer] Access Code: [email or web address] Expires: 8/12/2017  2:57 AM 
 
4. Enter the last four digits of your Social Security Number (xxxx) and Date of Birth (mm/dd/yyyy) as indicated and click Submit. You will be taken to the next sign-up page. 5. Create a [employer] ID. This will be your [employer] login ID and cannot be changed, so think of one that is secure and easy to remember. 6. Create a [employer] password. You can change your password at any time. 7. Enter your Password Reset Question and Answer. This can be used at a later time if you forget your password. 8. Enter your e-mail address. You will receive e-mail notification when new information is available in [address] 9. Click Sign Up. You can now view and download portions of your medical record. 10. Click the Download Summary menu link to download a portable copy of your medical information. If you have questions, please visit the Frequently Asked Questions section of the [employer] website. Remember, [employer] is NOT to be used for urgent needs. For medical emergencies, dial 911. Now available from your iPhone and Android! General Information Please provide this summary of care documentation to your next provider. Patient Signature:  ____________________________________________________________ Date:  ____________________________________________________________  
  
Genny Misty Provider Signature:  ____________________________________________________________ Date:  ____________________________________________________________

## 2017-05-19 NOTE — PROGRESS NOTES
8:38 AM  Dr. Lauren Win in room to assess the pt; SVE and AROM performed by Dr. Lauren Win, 3cm, scant amount of clear fluid noted. Pericare provided.

## 2017-05-19 NOTE — ROUTINE PROCESS
Bedside and Verbal shift change report given to anni joyce rn (oncoming nurse) by suzanne parker rn (offgoing nurse). Report included the following information SBAR, Kardex, Procedure Summary, Intake/Output, MAR, Accordion and Recent Results.

## 2017-05-19 NOTE — IP AVS SNAPSHOT
Summary of Care Report The Summary of Care report has been created to help improve care coordination. Users with access to [employer] or [address] (Web-based application) may access additional patient information including the Discharge Summary. If you are not currently a [address] user and need more information, please call the number listed below in the [place] [address] section and ask to be connected with Medical Records. Facility Information Name Address Phone [facility] [address] [phone] Patient Information Patient Name Sex  Michael Dykes ([id number]) Female 1980 Discharge Information Admitting Provider Service Area Unit Braxton Manzano MD / [phone] [address] [facility] 3 Mother Infant / [phone] Discharge Provider Discharge Date/Time Discharge Disposition Destination (none) 2017 Evening (Pending) AHR (none) Patient Language Language ENGLISH [13] Hospital Problems as of 2017  Never Reviewed Class Noted - Resolved Last Modified POA Active Problems Pregnancy  2017 - Present 2017 by Braxton Manzano MD Unknown Entered by Braxton Manzano MD  
  
You are allergic to the following No active allergies Current Discharge Medication List  
  
START taking these medications Dose & Instructions Dispensing Information Comments  
 oxyCODONE-acetaminophen 5-325 mg per tablet Commonly known as:  PERCOCET Dose:  1 Tab Take 1 Tab by mouth every six (6) hours as needed. Max Daily Amount: 4 Tabs. Quantity:  6 Tab Refills:  0 CONTINUE these medications which have NOT CHANGED Dose & Instructions Dispensing Information Comments  
 albuterol 2.5 mg /3 mL (0.083 %) nebulizer solution Commonly known as:  PROVENTIL VENTOLIN Dose:  2.5 mg  
3 mL by Nebulization route every four (4) hours as needed for Wheezing. Quantity:  24 Each Refills:  0  
   
 PNV No12-Iron-FA-DSS-OM-3 29 mg iron-1 mg -50 mg Cpkd Dose:  1 Tab Take 1 Tab by mouth daily. Indications: Pregnancy Refills:  0 VALTREX 500 mg tablet Generic drug:  valACYclovir Dose:  500 mg Take 500 mg by mouth two (2) times a day. Indications: HERPES ZOSTER Refills:  0 STOP taking these medications Comments  
 promethazine 12.5 mg tablet Commonly known as:  PHENERGAN Follow-up Information Follow up With Details Comments Contact Info None   None (395) Patient stated that they have no PCP Discharge Instructions Discharge Instructions for Vaginal Delivery Patient ID: 
Leno Blanchard 
[id number] 
33 y.o. 
1980 Take Home Medications Continue taking your prenatal vitamins if you are breastfeeding. Follow-up care is a key part of your treatment and safety. Please schedule and keep appointments. Follow-up with your primary OB in 6 weeks. Activity Avoid anything in your vagina for 6 weeks (no intercourse, tampons, or douching). You may drive unless you are taking prescription pain medications. Climbing stairs and light lifting are okay. Please avoid excessive exercise, though walking is okay- you'll be tired! Diet Regular diet as tolerated. Be sure to drink plenty of fluids if you are breastfeeding. Wound care If you have stitches, continue to rinse with a squirt bottle of warm water each time you void for about 7-10 days. .  Your stitches will gradually dissolve over four to eight weeks. Sitz baths are also helpful to keep the wound clean, encourage healing, and to help with pain associated with the stitches or hemorrhoids.   You can use either a sitz bath basin or a bathtub filled with 2-3\" inches of plain warm water. Soak for 10 minutes 3 times a day as tolerated. Pain Management 1. Over the counter medications such as Tylenol and ibuprofen (Motrin or Advil) are ideal.  These may be taken together, alternating doses. You may  take the maximum dose:  Motrin or Advil (generic ibuprofen), either 3 tablets every 6 hours or 4 tablets every 8 hours or Tylenol (acetominophen) 1000mg every 6 hours (equivalent to 2 extra strength Tylenol). 2. You may also have a precrescription for stronger pain medication. Take only as needed and transition to over the counter medication in the next few days. Minimize amounts of the prescription medication, as it can be habit-forming and will worsen or cause constipation. Most patients will find that within a couple of days, their pain is adequately controlled using only over-the-counter medications. 3. The prescription pain medication is mixed with Tylenol, therefore, you should not take any extra Tylenol or acetaminophen until you have reduced your prescription pain medication. 4. Add heating pad or sitz baths as needed. Add hemorrhoid wipes or ointments if needed Constipation 1. Constipation is normal after pregnancy and delivery, especially while taking prescription narcotic pain medication. 2. Over the counter remedies including ducosate (Colace), take 1-2 capsules 1-2 times daily for soft stool as needed. You may also add/ try milk of magnesia or rectal remedies such as Dulcolax or Fleets enema. Recovery: What to Expect at [facility] 1. Fatigue is expected. Try to rest when you can and don't worry about doing housework or other tasks which can wait. 2. The soreness along your bottom will improve significantly over the first 2 weeks, but it may take 6 weeks before you are completely recovered. 3. Back pain or general body aches or muscle soreness are expected and should improve with acetominophen or ibuprofen. 4. Leg swelling due to pregnancy and/or IV fluids given in the hospital will take about two weeks to resolve. 5. Most women experience some form of the \"Baby Blues\" after having a baby. Feeling emotional, tearful, frustrated, anxious, sad, and irritable some of the time is normal and go away after about 2 weeks. Adequate rest and help from your family will help. Take breaks from caring for the baby. Call your doctor if your symptoms seem severe, last more than 2 weeks, or seem to be getting worse instead of better. Get help immediately if you have thoughts of wanting to hurt yourself or others! Call your doctor or seek immediate medical care if you have: 
Heavy vaginal bleeding, soaking through one or more pads an hour for several hours. Foul-smelling discharge from your vagina or incision. Consistent nausea and vomiting and cannot keep fluids down. Consistent pain that does not get better after you take pain medicine. Sudden chest pain and shortness of breath Signs of a blood clot: pain/ swelling/ increasing redness in your lower extremeties Signs of infection: increased pain in your abdomen or vaginal area; red streaks, warmth, or tenderness of your breasts; fever of 100.5 F or greater Chart Review Routing History No Routing History on File

## 2017-05-20 VITALS
BODY MASS INDEX: 32.96 KG/M2 | RESPIRATION RATE: 16 BRPM | DIASTOLIC BLOOD PRESSURE: 68 MMHG | SYSTOLIC BLOOD PRESSURE: 118 MMHG | HEIGHT: 67 IN | HEART RATE: 90 BPM | TEMPERATURE: 97.9 F | WEIGHT: 210 LBS | OXYGEN SATURATION: 92 %

## 2017-05-20 PROCEDURE — 74011250637 HC RX REV CODE- 250/637: Performed by: OBSTETRICS & GYNECOLOGY

## 2017-05-20 RX ORDER — OXYCODONE AND ACETAMINOPHEN 5; 325 MG/1; MG/1
1 TABLET ORAL
Qty: 6 TAB | Refills: 0 | Status: SHIPPED | OUTPATIENT
Start: 2017-05-20 | End: 2019-07-30 | Stop reason: ALTCHOICE

## 2017-05-20 RX ADMIN — OXYCODONE HYDROCHLORIDE AND ACETAMINOPHEN 1 TABLET: 5; 325 TABLET ORAL at 06:00

## 2017-05-20 RX ADMIN — OXYCODONE HYDROCHLORIDE AND ACETAMINOPHEN 1 TABLET: 5; 325 TABLET ORAL at 10:08

## 2017-05-20 RX ADMIN — IBUPROFEN 800 MG: 800 TABLET, FILM COATED ORAL at 12:44

## 2017-05-20 RX ADMIN — IBUPROFEN 800 MG: 800 TABLET, FILM COATED ORAL at 04:33

## 2017-05-20 RX ADMIN — OXYCODONE HYDROCHLORIDE AND ACETAMINOPHEN 1 TABLET: 5; 325 TABLET ORAL at 01:34

## 2017-05-20 NOTE — LACTATION NOTE
Problem: Lactation Care Plan  Goal: *Infant latching appropriately  Outcome: Resolved/Met Date Met:  05/20/17  Mom states baby nursing well. Third attempt to see patient today. Baby in nursery.       Goal: *Weight loss less than 10% of birth weight  Outcome: Resolved/Met Date Met:  05/20/17  Encouraged mom to Look for 8-12 feedings in 24 hours. Don't limit baby at breast, allow baby to come of breast on it's own. Baby may want to feed more often then every 2-3 hours. Baby may not feed that often, but feedings should be attempted. If baby doesn't nurse, Mom can hand express drops of colostrum and drip into baby's mouth, or give to baby by finger feeding, cup feeding,or spoon feeding.       Mom agrees with plan     Problem: Patient Education: Go to Patient Education Activity  Goal: Patient/Family Education  Outcome: Resolved/Met Date Met:  05/20/17  Discussed eating a healthy diet. Instructed mother to eat a variety of foods in order to get a well balanced diet. She should consume an extra 300-500 calories per day (more than her non-pregnant requirement.) These extra calories will help provide energy needed for optimal breast milk production. Mother also encouraged to \"drink to thirst\" and it is recommended that she drink fluids such as water and fruit/vegetable juice. Nutritious snacks should be available so that she can eat throughout the day to help satisfy her hunger and maintain a good milk supply. Continue taking your prenatal vitamins as long as you breast feed.       Chart shows numerous feedings, void, stool WNL. Discussed Importance of monitoring outputs and feedings on first week of Breastfeeding. Discussed ways to tell if baby getting enough, ie Voids and stools, by day 7, baby should have at least 4-6 wet diapers a day, change in color of stool to a seedy yellow, and return to birth wt within 2 weeks with a steady increase after that. . Follow up with pediatrician visit for weight check in 1-2 days reviewed. Discussed Breast feeding support groups and encouraged to call [employer] line number, [phone] or [facility] at [phone] for any questions/problems that arise.       Engorgement Care Guidelines: Anticipatory guidance shared. Reviewed how milk is made and normal phases of milk production. Taught care of engorged breasts -and how to help. If mom should experience engorged breas frequent breastfeeding encouraged, cool packs and motrin as tolerated.  Tati Zarate      Call your doctor, midwife and/or lactation consultant if:  · Baby is having no wet or dirty diapers   · Baby has dark colored urine after day 3  (should be pale yellow to clear)   · Baby has dark colored stools after day 4  (should be mustard yellow, with no meconium)   · Baby has fewer wet/soiled diapers or nurses less   frequently than the goals listed here   · Mom has symptoms of mastitis   (sore breast with fever, chills, flu-like aching)

## 2017-05-20 NOTE — DISCHARGE INSTRUCTIONS
Discharge Instructions for Vaginal Delivery    Patient ID:  Mickey Yañez  [id number]  21 y.o.  1980    Take Home Medications       Continue taking your prenatal vitamins if you are breastfeeding. Follow-up care is a key part of your treatment and safety. Please schedule and keep appointments. Follow-up with your primary OB in 6 weeks. Activity  Avoid anything in your vagina for 6 weeks (no intercourse, tampons, or douching). You may drive unless you are taking prescription pain medications. Climbing stairs and light lifting are okay. Please avoid excessive exercise, though walking is okay- you'll be tired! Diet  Regular diet as tolerated. Be sure to drink plenty of fluids if you are breastfeeding. Wound care  If you have stitches, continue to rinse with a squirt bottle of warm water each time you void for about 7-10 days. .  Your stitches will gradually dissolve over four to eight weeks. Sitz baths are also helpful to keep the wound clean, encourage healing, and to help with pain associated with the stitches or hemorrhoids. You can use either a sitz bath basin or a bathtub filled with 2-3\" inches of plain warm water. Soak for 10 minutes 3 times a day as tolerated. Pain Management  1. Over the counter medications such as Tylenol and ibuprofen (Motrin or Advil) are ideal.  These may be taken together, alternating doses. You may  take the maximum dose:  Motrin or Advil (generic ibuprofen), either 3 tablets every 6 hours or 4 tablets every 8 hours or Tylenol (acetominophen) 1000mg every 6 hours (equivalent to 2 extra strength Tylenol). 2. You may also have a precrescription for stronger pain medication. Take only as needed and transition to over the counter medication in the next few days. Minimize amounts of the prescription medication, as it can be habit-forming and will worsen or cause constipation.  Most patients will find that within a couple of days, their pain is adequately controlled using only over-the-counter medications. 3. The prescription pain medication is mixed with Tylenol, therefore, you should not take any extra Tylenol or acetaminophen until you have reduced your prescription pain medication. 4. Add heating pad or sitz baths as needed. Add hemorrhoid wipes or ointments if needed    Constipation  1. Constipation is normal after pregnancy and delivery, especially while taking prescription narcotic pain medication. 2. Over the counter remedies including ducosate (Colace), take 1-2 capsules 1-2 times daily for soft stool as needed. You may also add/ try milk of magnesia or rectal remedies such as Dulcolax or Fleets enema. Recovery: What to Expect at Home  1. Fatigue is expected. Try to rest when you can and don't worry about doing housework or other tasks which can wait. 2. The soreness along your bottom will improve significantly over the first 2 weeks, but it may take 6 weeks before you are completely recovered. 3. Back pain or general body aches or muscle soreness are expected and should improve with acetominophen or ibuprofen. 4. Leg swelling due to pregnancy and/or IV fluids given in the hospital will take about two weeks to resolve. 5. Most women experience some form of the \"Baby Blues\" after having a baby. Feeling emotional, tearful, frustrated, anxious, sad, and irritable some of the time is normal and go away after about 2 weeks. Adequate rest and help from your family will help. Take breaks from caring for the baby. Call your doctor if your symptoms seem severe, last more than 2 weeks, or seem to be getting worse instead of better. Get help immediately if you have thoughts of wanting to hurt yourself or others! Call your doctor or seek immediate medical care if you have:  Heavy vaginal bleeding, soaking through one or more pads an hour for several hours. Foul-smelling discharge from your vagina or incision.   Consistent nausea and vomiting and cannot keep fluids down. Consistent pain that does not get better after you take pain medicine.   Sudden chest pain and shortness of breath  Signs of a blood clot: pain/ swelling/ increasing redness in your lower extremeties  Signs of infection: increased pain in your abdomen or vaginal area; red streaks, warmth, or tenderness of your breasts; fever of 100.5 F or greater

## 2017-05-20 NOTE — PROGRESS NOTES
Post-Partum Day Number 1 Progress Note    [address] for the patient's :  Gaviota Millan, Female [[id number]]   Vaginal, Spontaneous Delivery   Patient doing well without significant complaint. Voiding without difficulty, normal lochia. Interested in discharge home today    Vitals:  Visit Vitals    /68 (BP 1 Location: Right arm, BP Patient Position: Sitting)    Pulse 90    Temp 97.9 °F (36.6 °C)    Resp 16    Ht 5' 7\" (1.702 m)    Wt 95.3 kg (210 lb)    SpO2 92%    Breastfeeding Unknown    BMI 32.89 kg/m2     Temp (24hrs), Av.1 °F (36.7 °C), Min:97.9 °F (36.6 °C), Max:98.5 °F (36.9 °C)        Exam:   Patient without distress. FF @ U-2 NT                LE NT w/o edema    Labs:     Lab Results   Component Value Date/Time    WBC 8.9 2017 06:23 AM    WBC 10.8 10/23/2016 02:17 PM    HGB 11.2 2017 06:23 AM    HGB 12.8 10/23/2016 02:17 PM    HCT 34.3 2017 06:23 AM    HCT 36.7 10/23/2016 02:17 PM    PLATELET 234  06:23 AM    PLATELET 753  02:17 PM    Hgb, External 12.8 10/05/2016    Hct, External 38.7 10/05/2016    Platelet cnt., External 297 10/05/2016       No results found for this or any previous visit (from the past 24 hour(s)). Assessment: PPD 1 s/p , stable    Plan:  1. Continue routine postpartum care  2.  Elodia Dakin for discharge home

## 2019-07-30 ENCOUNTER — OFFICE VISIT (OUTPATIENT)
Dept: PRIMARY CARE CLINIC | Age: 39
End: 2019-07-30

## 2019-07-30 ENCOUNTER — TELEPHONE (OUTPATIENT)
Dept: PRIMARY CARE CLINIC | Age: 39
End: 2019-07-30

## 2019-07-30 VITALS
RESPIRATION RATE: 16 BRPM | TEMPERATURE: 98 F | BODY MASS INDEX: 34.49 KG/M2 | HEIGHT: 65 IN | OXYGEN SATURATION: 97 % | DIASTOLIC BLOOD PRESSURE: 71 MMHG | SYSTOLIC BLOOD PRESSURE: 111 MMHG | HEART RATE: 79 BPM | WEIGHT: 207 LBS

## 2019-07-30 DIAGNOSIS — E78.2 MIXED HYPERLIPIDEMIA: ICD-10-CM

## 2019-07-30 DIAGNOSIS — E66.9 OBESITY (BMI 30.0-34.9): ICD-10-CM

## 2019-07-30 DIAGNOSIS — E55.9 VITAMIN D DEFICIENCY: ICD-10-CM

## 2019-07-30 DIAGNOSIS — M79.672 PAIN IN BOTH FEET: ICD-10-CM

## 2019-07-30 DIAGNOSIS — J45.20 MILD INTERMITTENT ASTHMA WITHOUT COMPLICATION: Primary | ICD-10-CM

## 2019-07-30 DIAGNOSIS — M79.671 PAIN IN BOTH FEET: ICD-10-CM

## 2019-07-30 DIAGNOSIS — M54.2 NECK PAIN: ICD-10-CM

## 2019-07-30 PROBLEM — Z34.90 PREGNANCY: Status: RESOLVED | Noted: 2017-05-19 | Resolved: 2019-07-30

## 2019-07-30 RX ORDER — PHENTERMINE HYDROCHLORIDE 37.5 MG/1
37.5 TABLET ORAL
Qty: 14 TAB | Refills: 0 | Status: SHIPPED | OUTPATIENT
Start: 2019-07-30 | End: 2019-08-13

## 2019-07-30 NOTE — PROGRESS NOTES
Written by Brandon Mensah, as dictated by Dr. Ailyn Newell MD.    Anna Vazquez is a 42 y.o. female. HPI  The patient comes in today to establish care. Patient moved to [place] from [place] about 2 years ago. Last labs were drawn about 2 years ago during her pregnancy. She is fasting today. Patient gained about 30 lbs during her pregnancy and notes that her current weight is her highest ever. The pt has a gym membership which she does not use. She feels like she does not have time for herself and lacks motivation. Pt eats a relatively healthy diet, but believes the times she eats are an issue. She likes to eat seafood, noting she often eats shrimp. Before her most recent pregnancy she weighed 160 lbs. Patient previously tried an appetite suppressant, but it did not work well as she did not have issues controlling her appetite. The patient would like to try medication for weight loss as her schedule makes lifestyle changes difficult. She has constant BL foot pain which prevents her from wearing high heels. Pt believes her arch may have dropped or the pain is due to changes and weight gain during her pregnancy as her sxs started afterwards. The patient has toes on both feet which have swollen and sometimes change color. She is on her feet all day for work and believes she does not wear shoes with enough support. The pt has been under stress as she has 4 children and is worried about her husbands health. She is also dealing with issues with her family and her 's family. Sometimes she has chest pains, which she attributes to stress. Patient is also having difficulties focusing. A few years ago she was having emotional issues and was put on medication. However the medication called mental fogginess. She is also having neck and back pain. Pt notes her cervical ROM is limited.  The pt does not take OTC pain relievers as it causes her to fall asleep and she has to watch her 4 y.o. Patient has asthma which onset in adulthood. She takes albuterol as needed. Patient also improves her asthma with cool air and using a fan. The pt has FHX of asthma. Cholesterol was high at one point in the past but she was not started on medication. She has some constipation, which she believes is due to recent diet. The pt wears bifocals. Last pap smear was about 2 years ago. She has not seen her OB/GYN since after her daughter was born 2 years ago. Patient is still nursing. 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 Patient Active Problem List   Diagnosis Code   (none) - all problems resolved or deleted        Current Outpatient Medications on File Prior to Visit   Medication Sig Dispense Refill    PNV No12-Iron-FA-DSS-OM-3 29 mg iron-1 mg -50 mg CPKD Take 1 Tab by mouth daily. Indications: Pregnancy      valACYclovir (VALTREX) 500 mg tablet Take 500 mg by mouth two (2) times a day. Indications: HERPES ZOSTER      albuterol (PROVENTIL VENTOLIN) 2.5 mg /3 mL (0.083 %) nebulizer solution 3 mL by Nebulization route every four (4) hours as needed for Wheezing. 24 Each 0     No current facility-administered medications on file prior to visit.         Allergies   Allergen Reactions    Lactose Diarrhea       Past Medical History:   Diagnosis Date    Asthma     as a child    Genital herpes     denies current outbreak       Family History   Problem Relation Age of Onset    Asthma Mother     Asthma Brother        Social History     Socioeconomic History    Marital status:      Spouse name: Not on file    Number of children: Not on file    Years of education: Not on file    Highest education level: Not on file   Occupational History    Not on file   Social Needs    Financial resource strain: Not on file    Food insecurity:     Worry: Not on file     Inability: Not on file    Transportation needs:     Medical: Not on file Non-medical: Not on file   Tobacco Use    Smoking status: Never Smoker    Smokeless tobacco: Never Used   Substance and Sexual Activity    Alcohol use: No    Drug use: No    Sexual activity: Yes     Partners: Male     Birth control/protection: None   Lifestyle    Physical activity:     Days per week: Not on file     Minutes per session: Not on file    Stress: Not on file   Relationships    Social connections:     Talks on phone: Not on file     Gets together: Not on file     Attends Mandaen service: Not on file     Active member of club or organization: Not on file     Attends meetings of clubs or organizations: Not on file     Relationship status: Not on file    Intimate partner violence:     Fear of current or ex partner: Not on file     Emotionally abused: Not on file     Physically abused: Not on file     Forced sexual activity: Not on file   Other Topics Concern    Not on file   Social History Narrative    Not on file       Review of Systems   Constitutional: Negative for malaise/fatigue. HENT: Negative for congestion. Eyes: Negative for blurred vision and pain. Respiratory: Negative for cough and shortness of breath. Cardiovascular: Positive for chest pain. Negative for palpitations. Gastrointestinal: Positive for constipation. Negative for abdominal pain and heartburn. Genitourinary: Negative for frequency and urgency. Musculoskeletal: Positive for back pain and neck pain. Negative for joint pain and myalgias. +BL foot pain   Neurological: Negative for dizziness, tingling, sensory change, weakness and headaches. Psychiatric/Behavioral: Negative for depression, memory loss and substance abuse. The patient is nervous/anxious.       Visit Vitals  /71 (BP 1 Location: Left arm, BP Patient Position: Sitting)   Pulse 79   Temp 98 °F (36.7 °C) (Oral)   Resp 16   Ht 5' 5.35\" (1.66 m)   Wt 207 lb (93.9 kg)   SpO2 97%   BMI 34.07 kg/m²         Physical Exam   Constitutional: She is oriented to person, place, and time. She appears well-developed. No distress. Obese     HENT:   Right Ear: External ear normal.   Left Ear: External ear normal.   Eyes: Conjunctivae and EOM are normal. Right eye exhibits no discharge. Left eye exhibits no discharge. Neck: Normal range of motion. Neck supple. Cardiovascular: Normal rate, regular rhythm and normal heart sounds. Pulmonary/Chest: Effort normal and breath sounds normal. She has no wheezes. Abdominal: Soft. Bowel sounds are normal. There is no tenderness. Musculoskeletal:   Upper back spasm   Lymphadenopathy:     She has no cervical adenopathy. Neurological: She is alert and oriented to person, place, and time. Skin: She is not diaphoretic. Psychiatric: She has a normal mood and affect. Her behavior is normal.   Nursing note and vitals reviewed. ASSESSMENT and PLAN    ICD-10-CM ICD-9-CM    1. Mild intermittent asthma without complication B49.36 387.09 Well managed with albuterol inhaler as needed. 2. Pain in both feet M79.671 729.5 Weight loss should improve her pain. M79.672     3. Obesity (BMI 30.0-34. 9) E66.9 278.00 phentermine (ADIPEX-P) 37.5 mg tablet script given to patient. Phentermine 37.5 mg prescribed x 2 weeks. Potential side effects were discussed. Advised to start with 1/2 tab PO with breakfast. If she still feels hungry in the afternoon or evening she should take 1 tab with breakfast. she should stop phentermine if she has palpitations. If she does not lose 4-5 lbs in 2 weeks I cannot continue prescribing phentermine. Patient counseled about birth control and she should take extra precautions to prevent pregnancy on this medication. EKG will be performed at 2 week follow-up. No history of glaucoma. Encouraged healthy diet and exercise. Patient will stop nursing. 4. Neck pain M54.2 723.1 Recommended a massage. 5. Vitamin D deficiency E55.9 268.9 VITAMIN D, 25 HYDROXY    Vitamin D ordered.    6. Mixed hyperlipidemia V79.4 697.2 METABOLIC PANEL, COMPREHENSIVE      CBC W/O DIFF      LIPID PANEL      TSH 3RD GENERATION    CMP, CBC, lipid panel, and TSH ordered. This plan was reviewed with the patient and patient agrees. All questions were answered. This scribe documentation was reviewed by me and accurately reflects the examination and decisions made by me. This note will not be viewable in [address]

## 2019-07-30 NOTE — PROGRESS NOTES
Harley Roth is a 42 y.o. female     Chief Complaint   Patient presents with   [facility] Patient est PCP       Visit Vitals  /71 (BP 1 Location: Left arm, BP Patient Position: Sitting)   Pulse 79   Temp 98 °F (36.7 °C) (Oral)   Resp 16   Ht 5' 5.35\" (1.66 m)   Wt 207 lb (93.9 kg)   SpO2 97%   BMI 34.07 kg/m²       Health Maintenance Due   Topic Date Due    DTaP/Tdap/Td series (1 - Tdap) 04/02/2003    PAP AKA CERVICAL CYTOLOGY  04/02/2003       1. Have you been to the ER, urgent care clinic since your last visit? Hospitalized since your last visit? No    2. Have you seen or consulted any other health care providers outside of the [address] [place] [address] since your last visit? Include any pap smears or colon screening.  No

## 2019-07-31 LAB
25(OH)D3+25(OH)D2 SERPL-MCNC: 9.3 NG/ML (ref 30–100)
ALBUMIN SERPL-MCNC: 4.5 G/DL (ref 3.5–5.5)
ALBUMIN/GLOB SERPL: 1.5 {RATIO} (ref 1.2–2.2)
ALP SERPL-CCNC: 106 IU/L (ref 39–117)
ALT SERPL-CCNC: 9 IU/L (ref 0–32)
AST SERPL-CCNC: 9 IU/L (ref 0–40)
BILIRUB SERPL-MCNC: 0.3 MG/DL (ref 0–1.2)
BUN SERPL-MCNC: 13 MG/DL (ref 6–20)
BUN/CREAT SERPL: 25 (ref 9–23)
CALCIUM SERPL-MCNC: 9.6 MG/DL (ref 8.7–10.2)
CHLORIDE SERPL-SCNC: 101 MMOL/L (ref 96–106)
CHOLEST SERPL-MCNC: 260 MG/DL (ref 100–199)
CO2 SERPL-SCNC: 23 MMOL/L (ref 20–29)
CREAT SERPL-MCNC: 0.52 MG/DL (ref 0.57–1)
ERYTHROCYTE [DISTWIDTH] IN BLOOD BY AUTOMATED COUNT: 14 % (ref 12.3–15.4)
GLOBULIN SER CALC-MCNC: 3 G/DL (ref 1.5–4.5)
GLUCOSE SERPL-MCNC: 89 MG/DL (ref 65–99)
HCT VFR BLD AUTO: 41.2 % (ref 34–46.6)
HDLC SERPL-MCNC: 55 MG/DL
HGB BLD-MCNC: 13.3 G/DL (ref 11.1–15.9)
LDLC SERPL CALC-MCNC: 189 MG/DL (ref 0–99)
MCH RBC QN AUTO: 28.7 PG (ref 26.6–33)
MCHC RBC AUTO-ENTMCNC: 32.3 G/DL (ref 31.5–35.7)
MCV RBC AUTO: 89 FL (ref 79–97)
PLATELET # BLD AUTO: 341 X10E3/UL (ref 150–450)
POTASSIUM SERPL-SCNC: 5.1 MMOL/L (ref 3.5–5.2)
PROT SERPL-MCNC: 7.5 G/DL (ref 6–8.5)
RBC # BLD AUTO: 4.63 X10E6/UL (ref 3.77–5.28)
SODIUM SERPL-SCNC: 140 MMOL/L (ref 134–144)
TRIGL SERPL-MCNC: 80 MG/DL (ref 0–149)
TSH SERPL DL<=0.005 MIU/L-ACNC: 0.87 UIU/ML (ref 0.45–4.5)
VLDLC SERPL CALC-MCNC: 16 MG/DL (ref 5–40)
WBC # BLD AUTO: 7.5 X10E3/UL (ref 3.4–10.8)

## 2019-07-31 RX ORDER — ALBUTEROL SULFATE 0.83 MG/ML
2.5 SOLUTION RESPIRATORY (INHALATION)
Qty: 24 EACH | Refills: 0 | Status: SHIPPED | OUTPATIENT
Start: 2019-07-31 | End: 2020-03-19 | Stop reason: ALTCHOICE

## 2019-08-13 ENCOUNTER — OFFICE VISIT (OUTPATIENT)
Dept: PRIMARY CARE CLINIC | Age: 39
End: 2019-08-13

## 2019-08-13 VITALS
SYSTOLIC BLOOD PRESSURE: 132 MMHG | HEART RATE: 86 BPM | OXYGEN SATURATION: 98 % | BODY MASS INDEX: 34.09 KG/M2 | TEMPERATURE: 98.5 F | RESPIRATION RATE: 16 BRPM | HEIGHT: 65 IN | DIASTOLIC BLOOD PRESSURE: 83 MMHG | WEIGHT: 204.6 LBS

## 2019-08-13 DIAGNOSIS — E66.9 OBESITY (BMI 30.0-34.9): ICD-10-CM

## 2019-08-13 DIAGNOSIS — G89.29 CHRONIC MIDLINE LOW BACK PAIN WITHOUT SCIATICA: Primary | ICD-10-CM

## 2019-08-13 DIAGNOSIS — M54.50 CHRONIC MIDLINE LOW BACK PAIN WITHOUT SCIATICA: Primary | ICD-10-CM

## 2019-08-13 DIAGNOSIS — E55.9 VITAMIN D DEFICIENCY: ICD-10-CM

## 2019-08-13 DIAGNOSIS — Z79.899 MEDICATION MANAGEMENT: ICD-10-CM

## 2019-08-13 DIAGNOSIS — E78.2 MIXED HYPERLIPIDEMIA: ICD-10-CM

## 2019-08-13 RX ORDER — PHENTERMINE HYDROCHLORIDE 37.5 MG/1
37.5 TABLET ORAL
Qty: 30 TAB | Refills: 0 | Status: SHIPPED | OUTPATIENT
Start: 2019-08-13 | End: 2019-09-12
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
## 2019-08-13 NOTE — PROGRESS NOTES
Visit Vitals  /83 (BP 1 Location: Left arm, BP Patient Position: Sitting)   Pulse 86   Temp 98.5 °F (36.9 °C) (Oral)   Resp 16   Ht 5' 5.35\" (1.66 m)   Wt 204 lb 9.6 oz (92.8 kg)   SpO2 98%   BMI 33.68 kg/m²           Chief Complaint   Patient presents with    Follow-up     Weight Management     Medication Evaluation     Phentermine; EKG        HM Due Reviewed/WNL.           ====Mora Doyle Invitation====    Patient was invited to [employer] on this date and given the information folder for review. Recommended appointment with Mora Doyle facilitator for ACP conversation regarding advance directives. [x] Yes  [] No  Referral sent to [facility] Vivek team member or Coordinator for follow-up    [] Yes  [x] No  Patient scheduled an appointment. [facility]            1. Have you been to the ER, urgent care clinic since your last visit? Hospitalized since your last visit? Denies     2. Have you seen or consulted any other health care providers outside of the [address] [place] [address] since your last visit? Include any pap smears or colon screening.  Denies

## 2019-10-29 ENCOUNTER — OFFICE VISIT (OUTPATIENT)
Dept: PRIMARY CARE CLINIC | Age: 39
End: 2019-10-29

## 2019-10-29 VITALS
RESPIRATION RATE: 16 BRPM | SYSTOLIC BLOOD PRESSURE: 124 MMHG | HEIGHT: 65 IN | BODY MASS INDEX: 33.32 KG/M2 | WEIGHT: 200 LBS | HEART RATE: 82 BPM | TEMPERATURE: 98.3 F | DIASTOLIC BLOOD PRESSURE: 86 MMHG | OXYGEN SATURATION: 99 %

## 2019-10-29 DIAGNOSIS — Z32.00 POSSIBLE PREGNANCY, NOT CONFIRMED: ICD-10-CM

## 2019-10-29 DIAGNOSIS — R11.0 NAUSEA: Primary | ICD-10-CM

## 2019-10-29 LAB
HCG URINE, QL. (POC): NEGATIVE
VALID INTERNAL CONTROL?: YES

## 2019-10-29 NOTE — PROGRESS NOTES
Chief Complaint   Patient presents with   [facility] Other     states that she has nauesea and is craving foods. states that she would like to rule out pregnancy.   states that she has done a pregnancy test and states that she shows up neg often and even on the blood test.

## 2019-10-29 NOTE — PROGRESS NOTES
This note will not be viewable in [address] Brittny Wilkes is a 42y.o. year old female who is a new patient to me today. Brittny Wilkes is a  42 y.o. female presents for visit. Chief Complaint   Patient presents with    Pregnancy Test     states that she has nauesea and is craving foods. states that she would like to rule out pregnancy. states that she has done a pregnancy test and states that she shows up neg often and even on the blood test.        HPI    Patient presents with concern she may be pregnant. She is experiencing nausea, food cravings and extreme sensitivity to odors. Denies breast tenderness. She is unable to recall LMP but thinks it may be about 6 weeks ago. She is sexually active with her  and they do not use birth control. Reports she had similar symptoms when she was last pregnant. Home pregnancy test was negative yesterday. In past pregnancies home pregnancy tests were falsely negative    Review of Systems   Constitutional: Negative for fever. Gastrointestinal: Positive for heartburn (occasional), nausea and vomiting (1 episode). Genitourinary: Negative. Past Medical History:   Diagnosis Date    Asthma     as a child    Genital herpes     denies current outbreak      No past surgical history on file. Social History     Tobacco Use    Smoking status: Never Smoker    Smokeless tobacco: Never Used   Substance Use Topics    Alcohol use: No      Social History     Social History Narrative    Not on file     Family History   Problem Relation Age of Onset    Asthma Mother     Asthma Brother       Prior to Admission medications    Medication Sig Start Date End Date Taking? Authorizing Provider   albuterol (PROVENTIL VENTOLIN) 2.5 mg /3 mL (0.083 %) nebu 3 mL by Nebulization route every six (6) hours as needed (as needed). 7/31/19  Yes Emilee Moore MD   PNV No12-Iron-FA-DSS-OM-3 29 mg iron-1 mg -50 mg CPKD Take 1 Tab by mouth daily.  Indications: pregnancy 7/31/19  Yes Brandi Mckee MD   valACYclovir (VALTREX) 500 mg tablet Take 500 mg by mouth two (2) times a day. Indications: HERPES ZOSTER   Yes Provider, Historical      Allergies   Allergen Reactions    Lactose Diarrhea          Visit Vitals  /86 (BP 1 Location: Left arm, BP Patient Position: Sitting)   Pulse 82   Temp 98.3 °F (36.8 °C) (Oral)   Resp 16   Ht 5' 5\" (1.651 m)   Wt 200 lb (90.7 kg)   LMP  (LMP Unknown)   SpO2 99%   BMI 33.28 kg/m²     Physical Exam   Constitutional: She is oriented to person, place, and time. She appears well-developed and well-nourished. No distress. HENT:   Head: Normocephalic and atraumatic. Right Ear: External ear normal.   Left Ear: External ear normal.   Nose: Nose normal.   Eyes: Conjunctivae are normal.   Cardiovascular: Normal rate, regular rhythm and normal heart sounds. Pulmonary/Chest: Effort normal and breath sounds normal. She has no wheezes. She exhibits tenderness (epigastric). Abdominal: Soft. Bowel sounds are normal. She exhibits no distension. There is no tenderness. Neurological: She is alert and oriented to person, place, and time. Skin: Skin is warm and dry. Psychiatric: She has a normal mood and affect. Her speech is normal and behavior is normal.   Nursing note and vitals reviewed. Recent Results (from the past 24 hour(s))   AMB POC URINE PREGNANCY TEST, VISUAL COLOR COMPARISON    Collection Time: 10/29/19  9:59 AM   Result Value Ref Range    VALID INTERNAL CONTROL POC Yes     HCG urine, Ql. (POC) Negative Negative       ASSESSMENT AND PLAN:  There are no active problems to display for this patient. ICD-10-CM ICD-9-CM   1. Nausea R11.0 787.02   2. Possible pregnancy, not confirmed Z32.00 V72.40     Orders Placed This Encounter    HCG QL W/REFLEX TO HCG QN    METABOLIC PANEL, BASIC    CBC W/O DIFF    AMB POC URINE PREGNANCY TEST, VISUAL COLOR COMPARISON       Diagnoses and all orders for this visit:    1. Nausea  -     METABOLIC PANEL, BASIC  -     CBC W/O DIFF       -      Recommended bland diet as tolerated. 2. Possible pregnancy, not confirmed  -     AMB POC URINE PREGNANCY TEST, VISUAL COLOR COMPARISON  -     HCG QL W/REFLEX TO HCG QN        -     Start prenatal vitamin with folic acid. Avoid alcohol.      lab results and schedule of future lab studies reviewed with patient        Follow-up and Dispositions    · Return if symptoms worsen or fail to improve. Disclaimer:  Advised her to call back or return to office if symptoms worsen/change/persist.  Discussed expected course/resolution/complications of diagnosis in detail with patient. Medication risks/benefits/costs/interactions/alternatives discussed with patient. She was given an after visit summary which includes diagnoses, current medications, & vitals. She expressed understanding with the diagnosis and plan.

## 2019-10-30 LAB
B-HCG SERPL QL: NEGATIVE MIU/ML
BUN SERPL-MCNC: 12 MG/DL (ref 6–20)
BUN/CREAT SERPL: 21 (ref 9–23)
CALCIUM SERPL-MCNC: 9.5 MG/DL (ref 8.7–10.2)
CHLORIDE SERPL-SCNC: 97 MMOL/L (ref 96–106)
CO2 SERPL-SCNC: 19 MMOL/L (ref 20–29)
CREAT SERPL-MCNC: 0.58 MG/DL (ref 0.57–1)
ERYTHROCYTE [DISTWIDTH] IN BLOOD BY AUTOMATED COUNT: 13.1 % (ref 12.3–15.4)
GLUCOSE SERPL-MCNC: 87 MG/DL (ref 65–99)
HCT VFR BLD AUTO: 40.5 % (ref 34–46.6)
HGB BLD-MCNC: 13.5 G/DL (ref 11.1–15.9)
MCH RBC QN AUTO: 28.6 PG (ref 26.6–33)
MCHC RBC AUTO-ENTMCNC: 33.3 G/DL (ref 31.5–35.7)
MCV RBC AUTO: 86 FL (ref 79–97)
PLATELET # BLD AUTO: 365 X10E3/UL (ref 150–450)
POTASSIUM SERPL-SCNC: 4.8 MMOL/L (ref 3.5–5.2)
RBC # BLD AUTO: 4.72 X10E6/UL (ref 3.77–5.28)
SODIUM SERPL-SCNC: 135 MMOL/L (ref 134–144)
WBC # BLD AUTO: 7.8 X10E3/UL (ref 3.4–10.8)

## 2019-10-31 NOTE — PROGRESS NOTES
Results released to patient via my chart. All labs are stable or at goal for her. Negative pregnancy test results.

## 2019-12-10 ENCOUNTER — TELEPHONE (OUTPATIENT)
Dept: PRIMARY CARE CLINIC | Age: 39
End: 2019-12-10

## 2019-12-10 NOTE — TELEPHONE ENCOUNTER
Pharmacy Encounter:   Called patient's pharmacy and spoke with \"Andrew,\" pharmacist who reports that patient was previously prescribed Prednisone 50 mg by an Amparo Lucero and patient was now asking for Dr. Rajni Beth to refill. Advised not listed on patient's med list all the way back from 2016 and patient LOV was in 10/2019, and appointment would be required for acute issues, etc. Pharmacist advised he would cancel order. End of encounter. Called Patient:  Attempted to call patient to gather additional information, but mailbox is full. Patient will need to schedule an office visit for any acute issues she is having.

## 2019-12-31 ENCOUNTER — TELEPHONE (OUTPATIENT)
Dept: PRIMARY CARE CLINIC | Age: 39
End: 2019-12-31

## 2019-12-31 DIAGNOSIS — Z20.828 EXPOSURE TO THE FLU: Primary | ICD-10-CM

## 2019-12-31 RX ORDER — OSELTAMIVIR PHOSPHATE 75 MG/1
75 CAPSULE ORAL 2 TIMES DAILY
Qty: 10 CAP | Refills: 0 | Status: SHIPPED | OUTPATIENT
Start: 2019-12-31 | End: 2020-01-05

## 2020-02-21 ENCOUNTER — TELEPHONE (OUTPATIENT)
Dept: PRIMARY CARE CLINIC | Age: 40
End: 2020-02-21

## 2020-02-21 NOTE — TELEPHONE ENCOUNTER
Has a question for the nurse and needs to also talk about her spouse Rajinder Tan : 1981. Call back at your earliest convenience.

## 2020-02-21 NOTE — TELEPHONE ENCOUNTER
Called patient and she advises that she is in a meeting and will need to call back. End of encounter.

## 2020-03-04 ENCOUNTER — OFFICE VISIT (OUTPATIENT)
Dept: PRIMARY CARE CLINIC | Age: 40
End: 2020-03-04

## 2020-03-04 VITALS
TEMPERATURE: 98.6 F | HEART RATE: 89 BPM | BODY MASS INDEX: 35.16 KG/M2 | SYSTOLIC BLOOD PRESSURE: 126 MMHG | OXYGEN SATURATION: 96 % | HEIGHT: 65 IN | RESPIRATION RATE: 16 BRPM | DIASTOLIC BLOOD PRESSURE: 81 MMHG | WEIGHT: 211 LBS

## 2020-03-04 DIAGNOSIS — J02.9 SORE THROAT: Primary | ICD-10-CM

## 2020-03-04 DIAGNOSIS — R11.0 NAUSEA: ICD-10-CM

## 2020-03-04 DIAGNOSIS — R52 BODY ACHES: ICD-10-CM

## 2020-03-04 DIAGNOSIS — N92.6 MISSED MENSES: ICD-10-CM

## 2020-03-04 PROBLEM — E66.01 SEVERE OBESITY (HCC): Status: ACTIVE | Noted: 2020-03-04

## 2020-03-04 LAB
FLUAV+FLUBV AG NOSE QL IA.RAPID: NEGATIVE POS/NEG
FLUAV+FLUBV AG NOSE QL IA.RAPID: NEGATIVE POS/NEG
S PYO AG THROAT QL: NEGATIVE
VALID INTERNAL CONTROL?: YES
VALID INTERNAL CONTROL?: YES

## 2020-03-04 NOTE — PROGRESS NOTES
[facility]   [address] [facility], [address] [place] [address]  P: [phone]  F: [phone]      Chief Complaint   Patient presents with    Sore Throat     complaints of sore throat, states that she has body aches. Kavitha Armendariz is a 42 y.o. female who presents to clinic for Sore Throat (complaints of sore throat, states that she has body aches. ). HPI:    Ute Bhatia is a 42 yr old who presents with one day of sore throat, ear pain, and body aches. She denies receiving a flu shot this year. She has been taking Tylenol OTC which she has found helpful. 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 She is also requesting today to have blood work to check for pregnancy as she states multiple urine pregnancy test have not been accurate for her. She reports her last cycle was sometime in the month of February. Her  and her are trying for another child. Patient Active Problem List    Diagnosis    Severe obesity ([address]          Past Medical History:   Diagnosis Date    Asthma     as a child    Genital herpes     denies current outbreak     History reviewed. No pertinent surgical history.   Social History     Socioeconomic History    Marital status:      Spouse name: Not on file    Number of children: Not on file    Years of education: Not on file    Highest education level: Not on file   Occupational History    Not on file   Social Needs    Financial resource strain: Not on file    Food insecurity:     Worry: Not on file     Inability: Not on file    Transportation needs:     Medical: Not on file     Non-medical: Not on file   Tobacco Use    Smoking status: Never Smoker    Smokeless tobacco: Never Used   Substance and Sexual Activity    Alcohol use: No    Drug use: No    Sexual activity: Yes     Partners: Male     Birth control/protection: None   Lifestyle    Physical activity:     Days per week: Not on file     Minutes per session: Not on file    Stress: Not on file   Relationships    Social connections:     Talks on phone: Not on file     Gets together: Not on file     Attends Jain service: Not on file     Active member of club or organization: Not on file     Attends meetings of clubs or organizations: Not on file     Relationship status: Not on file    Intimate partner violence:     Fear of current or ex partner: Not on file     Emotionally abused: Not on file     Physically abused: Not on file     Forced sexual activity: Not on file   Other Topics Concern    Not on file   Social History Narrative    Not on file     Family History   Problem Relation Age of Onset    Asthma Mother     Asthma Brother      Allergies   Allergen Reactions    Lactose Diarrhea       Current Outpatient Medications   Medication Sig Dispense Refill    albuterol (PROVENTIL VENTOLIN) 2.5 mg /3 mL (0.083 %) nebu 3 mL by Nebulization route every six (6) hours as needed (as needed). 24 Each 0    PNV No12-Iron-FA-DSS-OM-3 29 mg iron-1 mg -50 mg CPKD Take 1 Tab by mouth daily. Indications: pregnancy 30 Each 3    valACYclovir (VALTREX) 500 mg tablet Take 500 mg by mouth two (2) times a day. Indications: HERPES ZOSTER       The medications were reviewed and updated in the medical record. The past medical history, past surgical history, and family history were reviewed and updated in the medical record. REVIEW OF SYSTEMS   Review of Systems   Constitutional: Positive for malaise/fatigue. HENT: Positive for congestion and sore throat. Eyes: Negative for blurred vision and pain. Respiratory: Negative for cough and shortness of breath. Cardiovascular: Negative for chest pain and palpitations. Gastrointestinal: Negative for abdominal pain and heartburn. Genitourinary: Negative for frequency and urgency. Musculoskeletal: Negative for joint pain and myalgias.    Neurological: Negative for dizziness, tingling, sensory change, weakness and headaches. Psychiatric/Behavioral: Negative for depression, memory loss and substance abuse. PHYSICAL EXAM     Visit Vitals  /81 (BP 1 Location: Left arm, BP Patient Position: Sitting)   Pulse 89   Temp 98.6 °F (37 °C) (Oral)   Resp 16   Ht 5' 5\" (1.651 m)   Wt 211 lb (95.7 kg)   LMP 2020   SpO2 96%   BMI 35.11 kg/m²       Physical Exam  Vitals signs and nursing note reviewed. Constitutional:       Appearance: She is obese. HENT:      Head: Normocephalic and atraumatic. Right Ear: Hearing, tympanic membrane, ear canal and external ear normal.      Left Ear: Hearing, tympanic membrane, ear canal and external ear normal.      Mouth/Throat: Tonsils: Swellin+ on the right. 1+ on the left. Cardiovascular:      Rate and Rhythm: Normal rate and regular rhythm. Heart sounds: Normal heart sounds. Pulmonary:      Effort: Pulmonary effort is normal.      Breath sounds: Normal breath sounds. Musculoskeletal: Normal range of motion. Skin:     General: Skin is warm and dry. Neurological:      Mental Status: She is alert and oriented to person, place, and time. Gait: Gait is intact. Psychiatric:         Mood and Affect: Affect normal.         Judgment: Judgment normal.       ASSESSMENT/ PLAN   Diagnoses and all orders for this visit:    1. Sore throat  -     AMB POC RAPID STREP A  -     CULTURE, STREP THROAT    2. Body aches  -     AMB POC LASHAY INFLUENZA A/B TEST    3. Missed menses  -     HCG QL SERUM    4. Nausea  -     HCG QL SERUM      Flu and strep are negative. Discussed likely viral pharyngitis. Encouraged rest, increased fluids, Tylenol/Ibuprofen for pain/fever and warm salt-water gargles as tolerated. Return to office if no improvement in 4-5 days.      Disclaimer:  Advised patient to call back or return to office if symptoms worsen/change/persist.  Discussed expected course/resolution/complications of diagnosis in detail with patient.     Medication risks/benefits/alternatives discussed with patient. Patient was given an after visit summary which includes diagnoses, current medications, & vitals.      Discussed patient instructions and advised to read to all patient instructions regarding care.      Patient expressed understanding with the diagnosis and plan. This note will not be viewable in [address]         Vidya Mcknight NP  3/4/2020        (This document has been electronically signed)

## 2020-03-04 NOTE — PROGRESS NOTES
Chief Complaint   Patient presents with    Sore Throat     complaints of sore throat, states that she has body aches.

## 2020-03-04 NOTE — LETTER
NOTIFICATION OF RETURN TO WORK / SCHOOL 
 
3/4/2020 Ms. Abhilash Beavers [address] [facility]. [phone] To Whom It May Concern: 
 
Abhilash Beavers was under the care of [facility]. Please excuse her from work on 3/4/2020. If there are questions or concerns please have the patient contact our office. Sincerely, Ascencion Khoury NP

## 2020-03-05 LAB — B-HCG SERPL QL: NEGATIVE MIU/ML

## 2020-03-07 LAB — S PYO THROAT QL CULT: NEGATIVE

## 2020-03-11 ENCOUNTER — PATIENT MESSAGE (OUTPATIENT)
Dept: PRIMARY CARE CLINIC | Age: 40
End: 2020-03-11

## 2020-03-11 DIAGNOSIS — R09.81 SINUS CONGESTION: Primary | ICD-10-CM

## 2020-03-11 DIAGNOSIS — R09.89 CHEST CONGESTION: ICD-10-CM

## 2020-03-11 RX ORDER — METHYLPREDNISOLONE 4 MG/1
TABLET ORAL
Qty: 1 DOSE PACK | Refills: 0 | Status: SHIPPED | OUTPATIENT
Start: 2020-03-11 | End: 2020-03-19 | Stop reason: ALTCHOICE

## 2020-03-11 RX ORDER — FLUTICASONE PROPIONATE 50 MCG
SPRAY, SUSPENSION (ML) NASAL
Qty: 1 BOTTLE | Refills: 1 | Status: SHIPPED | OUTPATIENT
Start: 2020-03-11 | End: 2020-03-19

## 2020-03-12 ENCOUNTER — TELEPHONE (OUTPATIENT)
Dept: PRIMARY CARE CLINIC | Age: 40
End: 2020-03-12

## 2020-03-12 DIAGNOSIS — R06.2 WHEEZING: Primary | ICD-10-CM

## 2020-03-12 RX ORDER — ALBUTEROL SULFATE 90 UG/1
1 AEROSOL, METERED RESPIRATORY (INHALATION)
Qty: 1 INHALER | Refills: 0 | Status: ON HOLD | OUTPATIENT
Start: 2020-03-12 | End: 2021-02-28 | Stop reason: SDUPTHER

## 2020-03-12 NOTE — TELEPHONE ENCOUNTER
-Has questions about some paperwork for her job. -Needs an OTC allergy med recommendation.  -Still waiting on rescue inhaler that was supposed to be sent over. Please call back ASAP.

## 2020-03-19 ENCOUNTER — OFFICE VISIT (OUTPATIENT)
Dept: PRIMARY CARE CLINIC | Age: 40
End: 2020-03-19

## 2020-03-19 VITALS
WEIGHT: 211 LBS | DIASTOLIC BLOOD PRESSURE: 88 MMHG | TEMPERATURE: 98.2 F | HEART RATE: 98 BPM | HEIGHT: 65 IN | OXYGEN SATURATION: 98 % | SYSTOLIC BLOOD PRESSURE: 136 MMHG | BODY MASS INDEX: 35.16 KG/M2 | RESPIRATION RATE: 16 BRPM

## 2020-03-19 DIAGNOSIS — Z91.09 ENVIRONMENTAL ALLERGIES: ICD-10-CM

## 2020-03-19 DIAGNOSIS — J30.1 SEASONAL ALLERGIC RHINITIS DUE TO POLLEN: ICD-10-CM

## 2020-03-19 DIAGNOSIS — J45.20 MILD INTERMITTENT ASTHMA WITHOUT COMPLICATION: Primary | ICD-10-CM

## 2020-03-19 RX ORDER — MONTELUKAST SODIUM 10 MG/1
10 TABLET ORAL DAILY
Qty: 30 TAB | Refills: 0 | Status: SHIPPED | OUTPATIENT
Start: 2020-03-19 | End: 2020-04-18

## 2020-03-19 RX ORDER — ALBUTEROL SULFATE 90 UG/1
1 AEROSOL, METERED RESPIRATORY (INHALATION)
Qty: 1 INHALER | Refills: 1 | Status: SHIPPED | OUTPATIENT
Start: 2020-03-19 | End: 2020-04-18

## 2020-03-19 NOTE — PROGRESS NOTES
Written by Opal Lord, as dictated by Dr. Markie Zeng MD.    Luciana Simpson is a 42 y.o. female. HPI  The patient presents today for a follow-up. She reports that her asthma has been flaring up frequently because of increased use of cleaning products due to COVID-19 with associated burning chest tightness. She states that closing her door at work has helped. She notes that some of her co-workers have been quarantined, so she is concerned about her exposure. However, she is unable to leave work temporarily. At time of visit, she notes persistent shortness of breath. She reports that her last asthma attack was 13 years ago prior to this episode. She has been using an Albuterol inhaler as needed with no relief, but notes that her symptoms are improved with use of a family member's nebulizer. She has never tried a long-acting inhaler. She also notes seasonal allergies with rhinorrhea and productive cough. She does not like using nasal sprays. She states being more anxious recently due to her mother's poor money management. BP is high today at 137/91, 136/88 on repeat. Patient Active Problem List   Diagnosis Code    [facility] E66.01        Current Outpatient Medications on File Prior to Visit   Medication Sig Dispense Refill    albuterol (PROVENTIL HFA, VENTOLIN HFA, PROAIR HFA) 90 mcg/actuation inhaler Take 1 Puff by inhalation every six (6) hours as needed for Wheezing. 1 Inhaler 0    [DISCONTINUED] fluticasone propionate (FLONASE) 50 mcg/actuation nasal spray 2 sprays each nostril daily. 1 Bottle 1    [DISCONTINUED] methylPREDNISolone (MEDROL DOSEPACK) 4 mg tablet Take as directed. 1 Dose Pack 0    [DISCONTINUED] albuterol (PROVENTIL VENTOLIN) 2.5 mg /3 mL (0.083 %) nebu 3 mL by Nebulization route every six (6) hours as needed (as needed).  24 Each 0    [DISCONTINUED] PNV No12-Iron-FA-DSS-OM-3 29 mg iron-1 mg -50 mg CPKD Take 1 Tab by mouth daily. Indications: pregnancy 30 Each 3    [DISCONTINUED] valACYclovir (VALTREX) 500 mg tablet Take 500 mg by mouth two (2) times a day. Indications: HERPES ZOSTER       No current facility-administered medications on file prior to visit. Allergies   Allergen Reactions    Lactose Diarrhea       Past Medical History:   Diagnosis Date    Asthma     as a child    Genital herpes     denies current outbreak       No past surgical history on file.     Family History   Problem Relation Age of Onset    Asthma Mother     Asthma Brother        Social History     Socioeconomic History    Marital status:      Spouse name: Not on file    Number of children: Not on file    Years of education: Not on file    Highest education level: Not on file   Occupational History    Not on file   Social Needs    Financial resource strain: Not on file    Food insecurity     Worry: Not on file     Inability: Not on file    Transportation needs     Medical: Not on file     Non-medical: Not on file   Tobacco Use    Smoking status: Never Smoker    Smokeless tobacco: Never Used   Substance and Sexual Activity    Alcohol use: No    Drug use: No    Sexual activity: Yes     Partners: Male     Birth control/protection: None   Lifestyle    Physical activity     Days per week: Not on file     Minutes per session: Not on file    Stress: Not on file   Relationships    Social connections     Talks on phone: Not on file     Gets together: Not on file     Attends Mandaeism service: Not on file     Active member of club or organization: Not on file     Attends meetings of clubs or organizations: Not on file     Relationship status: Not on file    Intimate partner violence     Fear of current or ex partner: Not on file     Emotionally abused: Not on file     Physically abused: Not on file     Forced sexual activity: Not on file   Other Topics Concern    Not on file   Social History Narrative    Not on file       Office Visit on 03/04/2020   Component Date Value Ref Range Status    VALID INTERNAL CONTROL POC 03/04/2020 Yes   Final    Group A Strep Ag 03/04/2020 Negative  Negative Final    Beta Strep Gp A Culture 03/04/2020 Negative   Final    hCG,Beta Subunit,Ql. 03/04/2020 Negative  Negative <6 mIU/mL Final    VALID INTERNAL CONTROL POC 03/04/2020 Yes   Final    Influenza A Ag POC 03/04/2020 Negative  Negative Pos/Neg Final    Influenza B Ag POC 03/04/2020 Negative  Negative Pos/Neg Final       Review of Systems   Constitutional: Negative for malaise/fatigue and weight loss. HENT: Negative for congestion and hearing loss. Positive for rhinorrhea. Eyes: Negative for blurred vision and photophobia. Respiratory: Positive for cough (productive) and shortness of breath. Cardiovascular: Negative for chest pain and leg swelling. Gastrointestinal: Negative for constipation, diarrhea and heartburn. Genitourinary: Negative for dysuria, frequency and urgency. Musculoskeletal: Negative for joint pain and myalgias. Neurological: Negative for dizziness and headaches. Psychiatric/Behavioral: Negative for depression and substance abuse. The patient is nervous/anxious. The patient does not have insomnia. Visit Vitals  /88 (BP 1 Location: Left arm, BP Patient Position: Sitting)   Pulse 98   Temp 98.2 °F (36.8 °C) (Oral)   Resp 16   Ht 5' 5\" (1.651 m)   Wt 211 lb (95.7 kg)   LMP 03/03/2020 (Within Days)   SpO2 98%    L/min   BMI 35.11 kg/m²       Physical Exam  Vitals signs and nursing note reviewed. Constitutional:       General: She is not in acute distress. Appearance: She is well-developed. She is not diaphoretic. HENT:      Head: Normocephalic and atraumatic. Right Ear: External ear normal.      Left Ear: External ear normal.   Eyes:      General:         Right eye: No discharge. Left eye: No discharge.       Conjunctiva/sclera: Conjunctivae normal.      Pupils: Pupils are equal, round, and reactive to light. Neck:      Musculoskeletal: Normal range of motion and neck supple. Cardiovascular:      Rate and Rhythm: Normal rate and regular rhythm. Heart sounds: Normal heart sounds. No murmur. No friction rub. No gallop. Pulmonary:      Effort: Pulmonary effort is normal.      Breath sounds: Normal breath sounds. No wheezing. Abdominal:      General: Bowel sounds are normal.      Palpations: Abdomen is soft. Tenderness: There is no abdominal tenderness. Musculoskeletal: Normal range of motion. Neurological:      Mental Status: She is alert and oriented to person, place, and time. Deep Tendon Reflexes: Reflexes are normal and symmetric. Psychiatric:         Behavior: Behavior normal.         Thought Content: Thought content normal.       ASSESSMENT and PLAN    ICD-10-CM ICD-9-CM    1. Mild intermittent asthma without complication W65.83 377.15 budesonide (PULMICORT) 180 mcg/actuation aepb inhaler sent to pharmacy    Pulmicort 180 mcg prescribed. Potential side effects were discussed. Pt should use 1 puff before going to work. albuterol (PROVENTIL HFA, VENTOLIN HFA, PROAIR HFA) 90 mcg/actuation inhaler sent to pharmacy    Albuterol 90 mcg inhaler refilled. I offered a letter for work accomodation letter. She will find out from her supervisor & will let me know. 2. Environmental allergies Z91.09 V15.09 montelukast (SINGULAIR) 10 mg tablet sent to pharmacy    Singulair 10 mg prescribed. Potential side effects were discussed. Pt should take 1 tab daily. Also recommended that she try OTC Zyrtec at night. 3. Seasonal allergic rhinitis due to pollen J30.1 477.0 montelukast (SINGULAIR) 10 mg tablet sent to pharmacy    Singulair 10 mg prescribed. Potential side effects were discussed. Pt should take 1 tab daily. Also recommended that she try OTC Zyrtec at night. This plan was reviewed with the patient and patient agrees.  All questions were answered. This scribe documentation was reviewed by me and accurately reflects the examination and decisions made by me. This note will not be viewable in [address]

## 2020-03-19 NOTE — LETTER
NOTIFICATION RETURN TO SCHOOL 
 
3/19/2020 9:37 AM 
 
Ms. Valeria Santo [address] [facility] [id number] To Whom It May Concern: 
 
Valeria Santo is currently under the care of [facility]. She will return to school on: 03/16/2020. If there are questions or concerns please have the patient contact our office. Sincerely, Danny Edwards MD

## 2020-03-19 NOTE — PROGRESS NOTES
Visit Vitals  BP (!) 137/91 (BP 1 Location: Left arm, BP Patient Position: Sitting)   Pulse 98   Temp 98.2 °F (36.8 °C) (Oral)   Resp 16   Ht 5' 5\" (1.651 m)   Wt 211 lb (95.7 kg)   SpO2 98%   BMI 35.11 kg/m²               Chief Complaint   Patient presents with    Follow Up Chronic Condition     Asthma Flare-Ups w/ increased use of cleaning products d/t COVID-19    Blood Pressure Check     Patient has elevated BP today              HM Due Reviewed and Updated. PF's: 450, 475, 450            1. Have you been to the ER, urgent care clinic since your last visit? Hospitalized since your last visit? Denies    2. Have you seen or consulted any other health care providers outside of the [address] [place] [address] since your last visit? Include any pap smears or colon screening.  OB-GYN

## 2020-03-25 ENCOUNTER — DOCUMENTATION ONLY (OUTPATIENT)
Dept: PRIMARY CARE CLINIC | Age: 40
End: 2020-03-25

## 2020-03-30 ENCOUNTER — TELEPHONE (OUTPATIENT)
Dept: PRIMARY CARE CLINIC | Age: 40
End: 2020-03-30

## 2020-03-30 NOTE — TELEPHONE ENCOUNTER
Patient just got back from [place] and her employer is saying she needs a note to come back, would like to speak to someone

## 2020-03-30 NOTE — TELEPHONE ENCOUNTER
Confirmed patient id,  Patient did not go to [place] she has family that lives with her and Dr Daphne Harris advised to quarantine for 14 days. Patient request letter for quarantine. She will pull from [facility] Chart.

## 2020-04-02 ENCOUNTER — TELEPHONE (OUTPATIENT)
Dept: PRIMARY CARE CLINIC | Age: 40
End: 2020-04-02

## 2020-04-02 DIAGNOSIS — J45.909 ASTHMA DUE TO SEASONAL ALLERGIES: Primary | ICD-10-CM

## 2020-04-02 RX ORDER — FLUTICASONE PROPIONATE 220 UG/1
1 AEROSOL, METERED RESPIRATORY (INHALATION) 2 TIMES DAILY
Qty: 1 INHALER | Refills: 1 | Status: SHIPPED | OUTPATIENT
Start: 2020-04-02 | End: 2020-07-01

## 2020-04-02 NOTE — TELEPHONE ENCOUNTER
Received denial from the prior auth done for pulmicort for patient. She has not  tried one of the two preferred inhalers.   Which marcin Arnuity Ellipta, Flovent diskus or Qvar

## 2020-04-15 ENCOUNTER — VIRTUAL VISIT (OUTPATIENT)
Dept: PRIMARY CARE CLINIC | Age: 40
End: 2020-04-15

## 2020-04-15 DIAGNOSIS — J45.20 MILD INTERMITTENT ASTHMA WITHOUT COMPLICATION: ICD-10-CM

## 2020-04-15 DIAGNOSIS — I10 ESSENTIAL HYPERTENSION: Primary | ICD-10-CM

## 2020-04-15 DIAGNOSIS — R51.9 FREQUENT HEADACHES: ICD-10-CM

## 2020-04-15 RX ORDER — PROPRANOLOL HYDROCHLORIDE 10 MG/1
10 TABLET ORAL 2 TIMES DAILY
Qty: 60 TAB | Refills: 0 | Status: SHIPPED | OUTPATIENT
Start: 2020-04-15 | End: 2020-05-15

## 2020-04-15 NOTE — PROGRESS NOTES
Written by Yudy Villarreal, as dictated by Dr. Maris Fraga MD.    Tommy Thomas is a 47 y.o. female. HPI  I was in the office while conducting this encounter. Consent:  She and/or her healthcare decision maker is aware that this patient-initiated Telehealth encounter is a billable service, with coverage as determined by her insurance carrier. She is aware that she may receive a bill and has provided verbal consent to proceed: Yes    This virtual visit was conducted via [employer]. Pursuant to the emergency declaration under the [facility], [facility] waiver authority and the [employer] Act, this Virtual  Visit was conducted to reduce the patient's risk of exposure to COVID-19 and provide continuity of care for an established patient. Services were provided through a video synchronous discussion virtually to substitute for in-person clinic visit. Due to this being a TeleHealth evaluation, many elements of the physical examination are unable to be assessed. The patient presents today c/o high blood pressure. She has started to check her BP at home. She is experiencing headaches, numbness, and eye pressure. Her BP was 159/68 yesterday 4/14/2020 and 135/39 today. She notes she does not have a hx of migraines. She reports her headaches intermittent. She reports her anxiety has gotten better but she is stressed when she goes to work however she is much more calm when she works from home. She states that her and her mother got into a argument and she has been experiencing shortness of breath ever since. She reports she does some nervousness about COVID-19. She is having trouble focusing on her work.        Patient Active Problem List   Diagnosis Code    Severe obesity ([facility] [address] E66.01        Current Outpatient Medications on File Prior to Visit   Medication Sig Dispense Refill    fluticasone propionate (FLOVENT HFA) 220 mcg/actuation inhaler Take 1 Puff by inhalation two (2) times a day for 90 days. 1 Inhaler 1    montelukast (SINGULAIR) 10 mg tablet Take 1 Tab by mouth daily for 30 days. 30 Tab 0    budesonide (PULMICORT) 180 mcg/actuation aepb inhaler Take 1 Puff by inhalation two (2) times daily as needed (as needed) for up to 30 days. 1 Inhaler 1    albuterol (PROVENTIL HFA, VENTOLIN HFA, PROAIR HFA) 90 mcg/actuation inhaler Take 1 Puff by inhalation every six (6) hours as needed for Wheezing for up to 30 days. 1 Inhaler 1    albuterol (PROVENTIL HFA, VENTOLIN HFA, PROAIR HFA) 90 mcg/actuation inhaler Take 1 Puff by inhalation every six (6) hours as needed for Wheezing. 1 Inhaler 0     No current facility-administered medications on file prior to visit. Allergies   Allergen Reactions    Lactose Diarrhea       Past Medical History:   Diagnosis Date    Asthma     as a child    Genital herpes     denies current outbreak       No past surgical history on file.     Family History   Problem Relation Age of Onset    Asthma Mother     Asthma Brother        Social History     Socioeconomic History    Marital status:      Spouse name: Not on file    Number of children: Not on file    Years of education: Not on file    Highest education level: Not on file   Occupational History    Not on file   Social Needs    Financial resource strain: Not on file    Food insecurity     Worry: Not on file     Inability: Not on file    Transportation needs     Medical: Not on file     Non-medical: Not on file   Tobacco Use    Smoking status: Never Smoker    Smokeless tobacco: Never Used   Substance and Sexual Activity    Alcohol use: No    Drug use: No    Sexual activity: Yes     Partners: Male     Birth control/protection: None   Lifestyle    Physical activity     Days per week: Not on file     Minutes per session: Not on file    Stress: Not on file   Relationships    Social connections     Talks on phone: Not on file     Gets together: Not on file     Attends [employer] service: Not on file     Active member of club or organization: Not on file     Attends meetings of clubs or organizations: Not on file     Relationship status: Not on file    Intimate partner violence     Fear of current or ex partner: Not on file     Emotionally abused: Not on file     Physically abused: Not on file     Forced sexual activity: Not on file   Other Topics Concern    Not on file   Social History Narrative    Not on file       Office Visit on 03/04/2020   Component Date Value Ref Range Status    VALID INTERNAL CONTROL POC 03/04/2020 Yes   Final    Group A Strep Ag 03/04/2020 Negative  Negative Final    Beta Strep Gp A Culture 03/04/2020 Negative   Final    hCG,Beta Subunit,Ql. 03/04/2020 Negative  Negative <6 mIU/mL Final    VALID INTERNAL CONTROL POC 03/04/2020 Yes   Final    Influenza A Ag POC 03/04/2020 Negative  Negative Pos/Neg Final    Influenza B Ag POC 03/04/2020 Negative  Negative Pos/Neg Final     Review of Systems   Constitutional: Negative for malaise/fatigue. HENT: Negative for congestion. Eyes: Positive for pain (pressure). Respiratory: Positive for shortness of breath. Cardiovascular: Negative for chest pain. Neurological: Positive for headaches. There were no vitals taken for this visit. Physical Exam  Constitutional:       General: She is not in acute distress. Appearance: She is well-developed. She is not diaphoretic. HENT:      Head: Normocephalic and atraumatic. Nose: No congestion. Eyes:      General:         Right eye: No discharge. Left eye: No discharge. Conjunctiva/sclera: Conjunctivae normal.   Neck:      Musculoskeletal: Normal range of motion. Pulmonary:      Effort: Pulmonary effort is normal. No respiratory distress. Neurological:      Mental Status: She is alert and oriented to person, place, and time.    Psychiatric: Behavior: Behavior normal.         Thought Content: Thought content normal.         ASSESSMENT and PLAN    ICD-10-CM ICD-9-CM    1. Essential hypertension I10 401.9 propranoloL (INDERAL) 10 mg tablet sent to pharmacy    Inderal 10 mg prescribed. Potential side effects were discussed. Pt should take 1/2 tab for three days BID and then start taking 1 full tab BID. Explained that her systolic BP should be below 026 and dystolic below 90. If BP goes below 110-115 she may feel dizzy and jittery   2. Frequent headaches R51 784.0 propranoloL (INDERAL) 10 mg tablet sent to pharmacy       Inderal 10 mg prescribed. Potential side effects were discussed. Pt should take 1/2 tab for three days BID and then start taking 1 full tab BID. Pt should take Tylenol or Tylenol arthritis for her pain. Pt should let me know if her symptoms are improving  via MyChart   3. Mild intermittent asthma without complication N22.88 730.63 Controlled at this time      Total Time: minutes: 11-20 minutes. This plan was reviewed with the patient and patient agrees. All questions were answered. This scribe documentation was reviewed by me and accurately reflects the examination and decisions made by me. This note will not be viewable in [address]

## 2020-09-03 ENCOUNTER — TELEPHONE (OUTPATIENT)
Dept: PRIMARY CARE CLINIC | Age: 40
End: 2020-09-03

## 2020-09-03 NOTE — TELEPHONE ENCOUNTER
Confirmed patient id and jesus is not able to see the latest letter taking out her pregnancy. Printed letter and emailed to patient.

## 2020-11-04 LAB
ANTIBODY SCREEN, EXTERNAL: NEGATIVE
HBSAG, EXTERNAL: NEGATIVE
HCT, EXTERNAL: 37.3
HGB, EXTERNAL: 12.8
HIV, EXTERNAL: NEGATIVE
PLATELET CNT,   EXTERNAL: 310
RPR, EXTERNAL: NORMAL
RUBELLA, EXTERNAL: NORMAL

## 2020-11-15 ENCOUNTER — HOSPITAL ENCOUNTER (OUTPATIENT)
Age: 40
Discharge: HOME | End: 2020-11-15
Attending: OBSTETRICS & GYNECOLOGY | Admitting: OBSTETRICS & GYNECOLOGY
Payer: MEDICAID

## 2020-11-15 VITALS
RESPIRATION RATE: 18 BRPM | HEIGHT: 67 IN | SYSTOLIC BLOOD PRESSURE: 127 MMHG | HEART RATE: 90 BPM | TEMPERATURE: 97.9 F | DIASTOLIC BLOOD PRESSURE: 74 MMHG | WEIGHT: 219 LBS | BODY MASS INDEX: 34.37 KG/M2

## 2020-11-15 PROBLEM — N93.9 VAGINAL BLEEDING: Status: ACTIVE | Noted: 2020-11-15

## 2020-11-15 PROCEDURE — 76815 OB US LIMITED FETUS(S): CPT

## 2020-11-15 PROCEDURE — 99284 EMERGENCY DEPT VISIT MOD MDM: CPT

## 2020-11-15 PROCEDURE — 99281 EMR DPT VST MAYX REQ PHY/QHP: CPT

## 2020-11-15 NOTE — H&P
History & Physical    Name: Reagan Jesus MRN: [id number]  SSN: [id number]    YOB: 1980  Age: 47 y.o. Sex: female      Subjective:     Reason for Admission:  Pregnancy and pain with spotting    History of Present Illness: Reagan Jesus is a 47 y.o.  female with an estimated gestational age of 12w2d with Estimated Date of Delivery: 21. Patient complains of with lower abdominal pain and spotting times 1, came in for some reassurance for 1 days. Pregnancy has been complicated by advanced maternal age. Patient denies fever, headache , nausea and vomiting, shortness of breath, vaginal leaking of fluid  and visual disturbances. OB History        5    Para   4    Term   4            AB        Living   4       SAB        TAB        Ectopic        Molar        Multiple   0    Live Births   4              Past Medical History:   Diagnosis Date    Asthma      Pt has not needed an inhaler for a year.  Genital herpes     denies current outbreak     No past surgical history on file. Social History     Occupational History    Not on file   Tobacco Use    Smoking status: Never Smoker    Smokeless tobacco: Never Used   Substance and Sexual Activity    Alcohol use: No    Drug use: No    Sexual activity: Yes     Partners: Male     Birth control/protection: None     Family History   Problem Relation Age of Onset    Asthma Mother     Asthma Brother        Allergies   Allergen Reactions    Lactose Diarrhea     Prior to Admission medications    Medication Sig Start Date End Date Taking? Authorizing Provider   albuterol (PROVENTIL HFA, VENTOLIN HFA, PROAIR HFA) 90 mcg/actuation inhaler Take 1 Puff by inhalation every six (6) hours as needed for Wheezing.  3/12/20   Vivian Cr, NP        Review of Systems All other systems negative    Objective:     Vitals:    Vitals:    11/15/20 0531 11/15/20 0534   BP: 127/74    Pulse: 90    Resp: 18    Temp: 97.9 °F (36.6 °C)    Weight: 99.3 kg (219 lb)   Height:  5' 6.5\" (1.689 m)      Temp (24hrs), Av.9 °F (36.6 °C), Min:97.9 °F (36.6 °C), Max:97.9 °F (36.6 °C)    BP  Min: 127/74  Max: 127/74     Physical Exam      Heart: RRR  Lungs: CTAB  Abdomen: Gravid, NT, NABS  Cervical Exam: Closed/Thick/High  SSE: neg blood, cervix visible, no discharge  Uterine Activity: None  Membranes: Intact  Fetal Heart Rate: present     Extremties: No edema, no evidence of DVT    Bedside ultrasound: fetus in variable lie, anterior placenta with no evidence of abruption, cervix 3 cm long on transabdominal scan    Labs: No results found for this or any previous visit (from the past 24 hour(s)). Patient Active Problem List   Diagnosis Code    Severe obesity ([facility] [address] E66.01    Mild intermittent asthma without complication S04.13     Assessment and Plan:     16 weeks with pain and spotting, no evidence of bleeding from cervix or uterus. Patient reassured, instructed to use heat and tylenol and follow up this week for her appointment. Precautions given.       Signed By:  Maile Mccord MD     November 15, 2020

## 2020-11-15 NOTE — DISCHARGE INSTRUCTIONS
Patient Education        Pregnancy Precautions: Care Instructions  Your Care Instructions     There is no sure way to prevent labor before your due date ( labor) or to prevent most other pregnancy problems. But there are things you can do to increase your chances of a healthy pregnancy. Go to your appointments, follow your doctor's advice, and take good care of yourself. Eat well, and exercise (if your doctor agrees). And make sure to drink plenty of water. Follow-up care is a key part of your treatment and safety. Be sure to make and go to all appointments, and call your doctor if you are having problems. It's also a good idea to know your test results and keep a list of the medicines you take. How can you care for yourself at home? · Make sure you go to your prenatal appointments. At each visit, your doctor will check your blood pressure. Your doctor will also check to see if you have protein in your urine. High blood pressure and protein in urine are signs of preeclampsia. This condition can be dangerous for you and your baby. · Drink plenty of fluids, enough so that your urine is light yellow or clear like water. Dehydration can cause contractions. If you have kidney, heart, or liver disease and have to limit fluids, talk with your doctor before you increase the amount of fluids you drink. · Tell your doctor right away if you notice any symptoms of an infection, such as:  ? Burning when you urinate. ? A foul-smelling discharge from your vagina. ? Vaginal itching. ? Unexplained fever. ? Unusual pain or soreness in your uterus or lower belly. · Eat a balanced diet. Include plenty of foods that are high in calcium and iron. ? Foods high in calcium include milk, cheese, yogurt, almonds, and broccoli. ? Foods high in iron include red meat, shellfish, poultry, eggs, beans, raisins, whole-grain bread, and leafy green vegetables. · Do not smoke.  If you need help quitting, talk to your doctor about stop-smoking programs and medicines. These can increase your chances of quitting for good. · Do not drink alcohol or use illegal drugs. · Follow your doctor's directions about activity. Your doctor will let you know how much, if any, exercise you can do. · Ask your doctor if you can have sex. If you are at risk for early labor, your doctor may ask you to not have sex. · Take care to prevent falls. During pregnancy, your joints are loose, and your balance is off. Sports such as bicycling, skiing, or in-line skating can increase your risk of falling. And don't ride horses or motorcycles, dive, water ski, scuba dive, or parachute jump while you are pregnant. · Avoid getting very hot. Do not use saunas or hot tubs. Avoid staying out in the sun in hot weather for long periods. Take acetaminophen (Tylenol) to lower a high fever. · Do not take any over-the-counter or herbal medicines or supplements without talking to your doctor or pharmacist first.  When should you call for help? Call 911 anytime you think you may need emergency care. For example, call if:    · You passed out (lost consciousness).     · You have a seizure.     · You have severe vaginal bleeding.     · You have severe pain in your belly or pelvis.     · You have had fluid gushing or leaking from your vagina and you know or think the umbilical cord is bulging into your vagina. If this happens, immediately get down on your knees so your rear end (buttocks) is higher than your head. This will decrease the pressure on the cord until help arrives. Call your doctor now or seek immediate medical care if:    · You have signs of preeclampsia, such as:  ? Sudden swelling of your face, hands, or feet. ? New vision problems (such as dimness, blurring, or seeing spots).   ? A severe headache.     · You have any vaginal bleeding.     · You have belly pain or cramping.     · You have a fever.     · You have had regular contractions (with or without pain) for an hour. This means that you have 8 or more within 1 hour or 4 or more in 20 minutes after you change your position and drink fluids.     · You have a sudden release of fluid from your vagina.     · You have low back pain or pelvic pressure that does not go away.     · You notice that your baby has stopped moving or is moving much less than normal.   Watch closely for changes in your health, and be sure to contact your doctor if you have any problems. Where can you learn more? Go to http://www.hightower.com/  Enter Y951 in the search box to learn more about \"Pregnancy Precautions: Care Instructions. \"  Current as of: February 11, 2020               Content Version: 12.6  © 0977-3236 [employer], [employer]. Care instructions adapted under license by [employer] (which disclaims liability or warranty for this information). If you have questions about a medical condition or this instruction, always ask your healthcare professional. [address] any warranty or liability for your use of this information.

## 2020-11-15 NOTE — PROGRESS NOTES
47yo  with MEGAN of 2019 to room 206 via wheelchair. Pt reports intermittent abdominal pain that woke her up from sleep this morning around 0300. Pt reports that after she went to the restroom she saw blood on the tissue when she wiped. Pt denies heavy bleeding. Pt reports the pain is cramping in nature, similar to how she feels when her period begins. FHT in the 150's via doppler. Pt oriented to room and call system. Cup filled with ice per pt request.    Nelli Ramirez at the bedside. Ultrasound and speculum exam done. No vaginal bleeding noted. 0630 Pt up to the bathroom to dress. No blood noted on preet pad.    0700 Discharge instructions reviewed with patient, understanding verbalized and pt signed instruction sheet.     0710 Pt discharged home ambulatory

## 2020-11-30 ENCOUNTER — HOSPITAL ENCOUNTER (EMERGENCY)
Age: 40
Discharge: HOME | End: 2020-11-30
Attending: EMERGENCY MEDICINE
Payer: MEDICAID

## 2020-11-30 VITALS
WEIGHT: 220 LBS | RESPIRATION RATE: 19 BRPM | OXYGEN SATURATION: 98 % | TEMPERATURE: 98 F | DIASTOLIC BLOOD PRESSURE: 76 MMHG | HEART RATE: 106 BPM | BODY MASS INDEX: 35.36 KG/M2 | HEIGHT: 66 IN | SYSTOLIC BLOOD PRESSURE: 125 MMHG

## 2020-11-30 DIAGNOSIS — R03.0 ELEVATED BLOOD PRESSURE READING: Primary | ICD-10-CM

## 2020-11-30 LAB
ALBUMIN SERPL-MCNC: 3.3 G/DL (ref 3.5–5)
ALBUMIN/GLOB SERPL: 0.9 {RATIO} (ref 1.1–2.2)
ALP SERPL-CCNC: 64 U/L (ref 45–117)
ALT SERPL-CCNC: 11 U/L (ref 12–78)
ANION GAP SERPL CALC-SCNC: 6 MMOL/L (ref 5–15)
APPEARANCE UR: ABNORMAL
AST SERPL-CCNC: 8 U/L (ref 15–37)
BACTERIA URNS QL MICRO: ABNORMAL /HPF
BASOPHILS # BLD: 0 K/UL (ref 0–0.1)
BASOPHILS NFR BLD: 0 % (ref 0–1)
BILIRUB SERPL-MCNC: 0.3 MG/DL (ref 0.2–1)
BILIRUB UR QL: NEGATIVE
BUN SERPL-MCNC: 9 MG/DL (ref 6–20)
BUN/CREAT SERPL: 19 (ref 12–20)
CALCIUM SERPL-MCNC: 9.2 MG/DL (ref 8.5–10.1)
CHLORIDE SERPL-SCNC: 107 MMOL/L (ref 97–108)
CO2 SERPL-SCNC: 23 MMOL/L (ref 21–32)
COLOR UR: ABNORMAL
CREAT SERPL-MCNC: 0.47 MG/DL (ref 0.55–1.02)
DIFFERENTIAL METHOD BLD: ABNORMAL
EOSINOPHIL # BLD: 0.4 K/UL (ref 0–0.4)
EOSINOPHIL NFR BLD: 3 % (ref 0–7)
EPITH CASTS URNS QL MICRO: ABNORMAL /LPF
ERYTHROCYTE [DISTWIDTH] IN BLOOD BY AUTOMATED COUNT: 14.5 % (ref 11.5–14.5)
GLOBULIN SER CALC-MCNC: 3.8 G/DL (ref 2–4)
GLUCOSE SERPL-MCNC: 89 MG/DL (ref 65–100)
GLUCOSE UR STRIP.AUTO-MCNC: NEGATIVE MG/DL
HCT VFR BLD AUTO: 36.8 % (ref 35–47)
HGB BLD-MCNC: 12.3 G/DL (ref 11.5–16)
HGB UR QL STRIP: NEGATIVE
IMM GRANULOCYTES # BLD AUTO: 0 K/UL (ref 0–0.04)
IMM GRANULOCYTES NFR BLD AUTO: 0 % (ref 0–0.5)
KETONES UR QL STRIP.AUTO: NEGATIVE MG/DL
LEUKOCYTE ESTERASE UR QL STRIP.AUTO: ABNORMAL
LYMPHOCYTES # BLD: 3.3 K/UL (ref 0.8–3.5)
LYMPHOCYTES NFR BLD: 27 % (ref 12–49)
MCH RBC QN AUTO: 28.6 PG (ref 26–34)
MCHC RBC AUTO-ENTMCNC: 33.4 G/DL (ref 30–36.5)
MCV RBC AUTO: 85.6 FL (ref 80–99)
MONOCYTES # BLD: 1 K/UL (ref 0–1)
MONOCYTES NFR BLD: 9 % (ref 5–13)
MUCOUS THREADS URNS QL MICRO: ABNORMAL /LPF
NEUTS SEG # BLD: 7.3 K/UL (ref 1.8–8)
NEUTS SEG NFR BLD: 61 % (ref 32–75)
NITRITE UR QL STRIP.AUTO: NEGATIVE
NRBC # BLD: 0 K/UL (ref 0–0.01)
NRBC BLD-RTO: 0 PER 100 WBC
PH UR STRIP: 7 [PH] (ref 5–8)
PLATELET # BLD AUTO: 336 K/UL (ref 150–400)
PMV BLD AUTO: 10.3 FL (ref 8.9–12.9)
POTASSIUM SERPL-SCNC: 4.1 MMOL/L (ref 3.5–5.1)
PROT SERPL-MCNC: 7.1 G/DL (ref 6.4–8.2)
PROT UR STRIP-MCNC: NEGATIVE MG/DL
RBC # BLD AUTO: 4.3 M/UL (ref 3.8–5.2)
RBC #/AREA URNS HPF: ABNORMAL /HPF (ref 0–5)
SODIUM SERPL-SCNC: 136 MMOL/L (ref 136–145)
SP GR UR REFRACTOMETRY: 1.02 (ref 1–1.03)
UR CULT HOLD, URHOLD: NORMAL
UROBILINOGEN UR QL STRIP.AUTO: 0.2 EU/DL (ref 0.2–1)
WBC # BLD AUTO: 12.1 K/UL (ref 3.6–11)
WBC URNS QL MICRO: ABNORMAL /HPF (ref 0–4)

## 2020-11-30 PROCEDURE — 85025 COMPLETE CBC W/AUTO DIFF WBC: CPT

## 2020-11-30 PROCEDURE — 80053 COMPREHEN METABOLIC PANEL: CPT

## 2020-11-30 PROCEDURE — 81001 URINALYSIS AUTO W/SCOPE: CPT

## 2020-11-30 PROCEDURE — 36415 COLL VENOUS BLD VENIPUNCTURE: CPT

## 2020-11-30 PROCEDURE — 99282 EMERGENCY DEPT VISIT SF MDM: CPT

## 2020-11-30 NOTE — ED PROVIDER NOTES
Date of Service:  11/30/2020    Patient:  Heidi Capellan    Chief Complaint:  Hypertension       HPI:  Heidi Capellan is a 47 y.o.  female who presents for evaluation of elevated blood pressure. Endorses blood pressure of high 140/100 earlier today, with one episodes of feeling dizzy. Spoke to Dr. Stephanie Brody Obgyn who recommended going to ED. No vision change, chest pain, sob, n/v/d, abdominal pain, dysuria. 18 weeks pregnant, obgyn is Dr. Stephanie Brody. Previous history of HTN before pregnancy, was on unknown blood pressure medication. Endorses is off work currently, but endorses stress at home. PMH: HTN, Asthma  Surgeries: none  Allergies: lactose           Past Medical History:   Diagnosis Date    Asthma      Pt has not needed an inhaler for a year.  Genital herpes     denies current outbreak       No past surgical history on file.       Family History:   Problem Relation Age of Onset    Asthma Mother     Asthma Brother        Social History     Socioeconomic History    Marital status:      Spouse name: Not on file    Number of children: Not on file    Years of education: Not on file    Highest education level: Not on file   Occupational History    Not on file   Social Needs    Financial resource strain: Not on file    Food insecurity     Worry: Not on file     Inability: Not on file    Transportation needs     Medical: Not on file     Non-medical: Not on file   Tobacco Use    Smoking status: Never Smoker    Smokeless tobacco: Never Used   Substance and Sexual Activity    Alcohol use: No    Drug use: No    Sexual activity: Yes     Partners: Male     Birth control/protection: None   Lifestyle    Physical activity     Days per week: Not on file     Minutes per session: Not on file    Stress: Not on file   Relationships    Social connections     Talks on phone: Not on file     Gets together: Not on file     Attends Taoist service: Not on file     Active member of club or organization: Not on file Attends meetings of clubs or organizations: Not on file     Relationship status: Not on file    Intimate partner violence     Fear of current or ex partner: Not on file     Emotionally abused: Not on file     Physically abused: Not on file     Forced sexual activity: Not on file   Other Topics Concern     Service Not Asked    Blood Transfusions Not Asked    Caffeine Concern Not Asked    Occupational Exposure Not Asked   [employer] Hazards Not Asked    Sleep Concern Not Asked    Stress Concern Not Asked    Weight Concern Not Asked    Special Diet Not Asked    Back Care Not Asked    Exercise Not Asked    Bike Helmet Not Asked   [facility] Not Asked    Self-Exams Not Asked   Social History Narrative    Not on file         ALLERGIES: Lactose    Review of Systems   Constitutional: Negative for chills and fever. HENT: Negative for congestion and sore throat. Eyes: Negative for pain. Respiratory: Negative for cough and shortness of breath. Cardiovascular: Negative for chest pain and palpitations. Gastrointestinal: Negative for abdominal pain, diarrhea, nausea and vomiting. Genitourinary: Negative for dysuria, frequency and urgency. Musculoskeletal: Negative for back pain and neck pain. Skin: Negative for rash and wound. Neurological: Negative for dizziness, light-headedness and headaches. Vitals:    11/30/20 1658   BP: 125/76   Pulse: (!) 106   Resp: 19   Temp: 98 °F (36.7 °C)   SpO2: 98%   Weight: 99.8 kg (220 lb)   Height: 5' 6\" (1.676 m)            Physical Exam  Vitals signs and nursing note reviewed. Constitutional:       Appearance: Normal appearance. HENT:      Head: Normocephalic and atraumatic. Nose: Nose normal.   Eyes:      Extraocular Movements: Extraocular movements intact. Conjunctiva/sclera: Conjunctivae normal.      Pupils: Pupils are equal, round, and reactive to light. Neck:      Musculoskeletal: Normal range of motion and neck supple. Cardiovascular:      Rate and Rhythm: Normal rate and regular rhythm. Pulses: Normal pulses. Radial pulses are 2+ on the right side and 2+ on the left side. Posterior tibial pulses are 2+ on the right side and 2+ on the left side. Heart sounds: Normal heart sounds. Pulmonary:      Effort: Pulmonary effort is normal.      Breath sounds: Normal breath sounds. Abdominal:      General: Abdomen is flat. Bowel sounds are normal.      Palpations: Abdomen is soft. Tenderness: There is no abdominal tenderness. There is no guarding or rebound. Musculoskeletal: Normal range of motion. Skin:     General: Skin is warm and dry. Neurological:      General: No focal deficit present. Mental Status: She is alert and oriented to person, place, and time. Mental status is at baseline. Psychiatric:         Mood and Affect: Mood normal.         Behavior: Behavior normal.         Thought Content:  Thought content normal.          MDM  Number of Diagnoses or Management Options  Elevated blood pressure reading:   Diagnosis management comments: LABS:  Recent Results (from the past 6 hour(s))  -CBC WITH AUTOMATED DIFF  Collection Time: 11/30/20  5:30 PM       Result                      Value             Ref Range           WBC                         12.1 (H)          3.6 - 11.0 K*       RBC                         4.30              3.80 - 5.20 *       HGB                         12.3              11.5 - 16.0 *       HCT                         36.8              35.0 - 47.0 %       MCV                         85.6              80.0 - 99.0 *       MCH                         28.6              26.0 - 34.0 *       MCHC                        33.4              30.0 - 36.5 *       RDW                         14.5              11.5 - 14.5 %       PLATELET                    336               150 - 400 K/*       MPV                         10.3              8.9 - 12.9 FL       NRBC 0.0               0  WBC       ABSOLUTE NRBC               0.00              0.00 - 0.01 *       NEUTROPHILS                 61                32 - 75 %           LYMPHOCYTES                 27                12 - 49 %           MONOCYTES                   9                 5 - 13 %            EOSINOPHILS                 3                 0 - 7 %             BASOPHILS                   0                 0 - 1 %             IMMATURE GRANULOCYTES       0                 0.0 - 0.5 %         ABS. NEUTROPHILS            7.3               1.8 - 8.0 K/*       ABS. LYMPHOCYTES            3.3               0.8 - 3.5 K/*       ABS. MONOCYTES              1.0               0.0 - 1.0 K/*       ABS. EOSINOPHILS            0.4               0.0 - 0.4 K/*       ABS. BASOPHILS              0.0               0.0 - 0.1 K/*       ABS. IMM.  GRANS.            0.0               0.00 - 0.04 *       DF                          AUTOMATED                        -METABOLIC PANEL, COMPREHENSIVE  Collection Time: 11/30/20  5:30 PM       Result                      Value             Ref Range           Sodium                      136               136 - 145 mm*       Potassium                   4.1               3.5 - 5.1 mm*       Chloride                    107               97 - 108 mmo*       CO2                         23                21 - 32 mmol*       Anion gap                   6                 5 - 15 mmol/L       Glucose                     89                65 - 100 mg/*       BUN                         9                 6 - 20 MG/DL        Creatinine                  0.47 (L)          0.55 - 1.02 *       BUN/Creatinine ratio        19                12 - 20             GFR est AA                  >60               >60 ml/min/1*       GFR est non-AA              >60               >60 ml/min/1*       Calcium                     9.2               8.5 - 10.1 M*       Bilirubin, total            0.3               0.2 - 1.0 MG*       ALT (SGPT)                  11 (L)            12 - 78 U/L         AST (SGOT)                  8 (L)             15 - 37 U/L         Alk.  phosphatase            64                45 - 117 U/L        Protein, total              7.1               6.4 - 8.2 g/*       Albumin                     3.3 (L)           3.5 - 5.0 g/*       Globulin                    3.8               2.0 - 4.0 g/*       A-G Ratio                   0.9 (L)           1.1 - 2.2      -URINALYSIS W/MICROSCOPIC  Collection Time: 11/30/20  5:41 PM       Result                      Value             Ref Range           Color                       YELLOW/STRAW                          Appearance                  CLOUDY (A)        CLEAR               Specific gravity            1.018             1.003 - 1.03*       pH (UA)                     7.0               5.0 - 8.0           Protein                     Negative          NEG mg/dL           Glucose                     Negative          NEG mg/dL           Ketone                      Negative          NEG mg/dL           Bilirubin                   Negative          NEG                 Blood                       Negative          NEG                 Urobilinogen                0.2               0.2 - 1.0 EU*       Nitrites                    Negative          NEG                 Leukocyte Esterase          LARGE (A)         NEG                 WBC                         5-10              0 - 4 /hpf          RBC                         0-5               0 - 5 /hpf          Epithelial cells            MANY (A)          FEW /lpf            Bacteria                    1+ (A)            NEG /hpf            Mucus                       TRACE (A)         NEG /lpf       -URINE CULTURE HOLD SAMPLE  Collection Time: 11/30/20  5:41 PM  Specimen: Serum; Urine       Result                      Value             Ref Range           Urine culture hold                                            Urine on hold in Microbiology dept for 2 days. If unpreserved urine is submitted, it cannot be used for addtional testing after 24 hours, recollection will be required. IMAGING:  No orders to display      Medications During Visit:  Medications - No data to display      DECISION MAKING:  Rashawn Purvis is a 47 y.o. female who comes in as above. Elevated blood pressure reading at home, 147/101, spoke to Dr. Rick Asif who recommended going to ED. Currently blood pressure is 125/76, patient resting comfortably, in no acute distress. CBC, CMP, UA completed, are reassuring. Will discharge home with close follow up with obgyn recommended. All results discussed with patients. Patient management discussed with attending physician who agrees with management. Strict return precautions to the ED discussed. IMPRESSION:  Elevated blood pressure reading  (primary encounter diagnosis)    DISPOSITION:  Discharged      Discharge Medication List as of 11/30/2020  6:47 PM         Follow-up Information     Follow up With Specialties Details Why Contact Info    Daija Cox MD Internal Medicine Schedule an appointment as soon as   possible for a visit in 1 week  [address] [facility] [address] [phone]      [facility] EMERGENCY DEPT Emergency Medicine Go to  If symptoms worsen [id number] [facility]    Saida Wright MD Obstetrics & Gynecology, Gynecology, Obstetrics   Schedule an appointment as soon as possible for a visit in 1 week  [address]  [address] [place] [address] [phone]            The patient is asked to follow-up with their primary care provider in the next several days. They are to call tomorrow for an appointment. The patient is asked to return promptly for any increased concerns or worsening of symptoms. They can return to this emergency department or any other emergency department.          Amount and/or Complexity of Data Reviewed  Decide to obtain previous medical records or to obtain history from someone other than the patient: yes      ED Course as of Nov 30 1840   Mon Nov 30, 2020   1718 5:18 PM  I have evaluated the patient as the Provider in Triage. I have reviewed Her vital signs and the triage nurse assessment. I have talked with the patient and any available family and advised that I am the provider in triage and have ordered the appropriate study to initiate their work up based on the clinical presentation during my assessment. I have advised that the patient will be accommodated in the Main ED as soon as possible. I have also requested to contact the triage nurse or myself immediately if the patient experiences any changes in their condition during this brief waiting period.   Corey Nino          [EK]      ED Course User Index  [EK] Marian Mendoza PA-C       Procedures

## 2020-11-30 NOTE — ED TRIAGE NOTES
Patient reports high BP at base line aims for 130 or below, takes BP medication but cannot take due to pregnancy. patient is 18 weeks pregnant. Became dizzy today and had a BP reading of 147/101 called dr ornelas and came to ER.

## 2021-01-01 ENCOUNTER — HOSPITAL ENCOUNTER (EMERGENCY)
Age: 41
Discharge: HOME | End: 2021-01-01
Attending: OBSTETRICS & GYNECOLOGY
Payer: MEDICAID

## 2021-01-01 VITALS
SYSTOLIC BLOOD PRESSURE: 128 MMHG | RESPIRATION RATE: 16 BRPM | TEMPERATURE: 98.3 F | HEART RATE: 98 BPM | DIASTOLIC BLOOD PRESSURE: 83 MMHG

## 2021-01-01 LAB
A1 MICROGLOB PLACENTAL VAG QL: NEGATIVE
APPEARANCE UR: CLEAR
BACTERIA URNS QL MICRO: ABNORMAL /HPF
BILIRUB UR QL: NEGATIVE
COLOR UR: ABNORMAL
CONTROL LINE PRESENT?: NORMAL
EPITH CASTS URNS QL MICRO: ABNORMAL /LPF
EXPIRATION DATE: NORMAL
FIBRONECTIN FETAL VAG QL: NEGATIVE
GLUCOSE UR STRIP.AUTO-MCNC: NEGATIVE MG/DL
HGB UR QL STRIP: NEGATIVE
INTERNAL NEGATIVE CONTROL: NORMAL
KETONES UR QL STRIP.AUTO: NEGATIVE MG/DL
KIT LOT NO.: NORMAL
LEUKOCYTE ESTERASE UR QL STRIP.AUTO: ABNORMAL
NITRITE UR QL STRIP.AUTO: NEGATIVE
PH UR STRIP: 6.5 [PH] (ref 5–8)
PROT UR STRIP-MCNC: NEGATIVE MG/DL
RBC #/AREA URNS HPF: ABNORMAL /HPF (ref 0–5)
SP GR UR REFRACTOMETRY: 1.02 (ref 1–1.03)
UA: UC IF INDICATED,UAUC: ABNORMAL
UROBILINOGEN UR QL STRIP.AUTO: 0.2 EU/DL (ref 0.2–1)
WBC URNS QL MICRO: ABNORMAL /HPF (ref 0–4)

## 2021-01-01 PROCEDURE — 99285 EMERGENCY DEPT VISIT HI MDM: CPT

## 2021-01-01 PROCEDURE — 82731 ASSAY OF FETAL FIBRONECTIN: CPT

## 2021-01-01 PROCEDURE — 74011250637 HC RX REV CODE- 250/637: Performed by: OBSTETRICS & GYNECOLOGY

## 2021-01-01 PROCEDURE — 81001 URINALYSIS AUTO W/SCOPE: CPT

## 2021-01-01 PROCEDURE — 75810000275 HC EMERGENCY DEPT VISIT NO LEVEL OF CARE

## 2021-01-01 PROCEDURE — 84112 EVAL AMNIOTIC FLUID PROTEIN: CPT | Performed by: OBSTETRICS & GYNECOLOGY

## 2021-01-01 RX ORDER — MAG HYDROX/ALUMINUM HYD/SIMETH 200-200-20
30 SUSPENSION, ORAL (FINAL DOSE FORM) ORAL
Status: DISCONTINUED | OUTPATIENT
Start: 2021-01-01 | End: 2021-01-01 | Stop reason: HOSPADM

## 2021-01-01 RX ADMIN — ALUMINUM HYDROXIDE, MAGNESIUM HYDROXIDE, AND SIMETHICONE 30 ML: 200; 200; 20 SUSPENSION ORAL at 14:03

## 2021-01-01 NOTE — PROGRESS NOTES
1310: Pt arrived to L&D room 208, pt reports intermittent cramping throughout the day, pt reports she is also having leaking of fluid throughout her pregnancy and denies vaginal bleeding. Pt oriented to room and unit, plan of care discussed with pt, pt verbalized understanding. 1323: Dr. Blayne Wolfe given update on pt, MD stated to do an amnisure and a UA, and she will be in to see the patient soon. 1345: Dr. Blayne Wolfe at bedside. 1355: FFN collected by [facility] per MD closed/long/high. 1410: This RN spoke with Dr. Blayne Wolfe and per MD pt can be discharged home if her UA and FFN are normal.    1453: Dr. Blayne Wolfe given update on UA results and FFN. MD stated pt can be discharged home at this time. 12: Pt given written and verbal discharge instructions, pt verbalized understanding and discharged home in stable condition. 1500: Pt ambulated off the unit in stable condition.

## 2021-01-01 NOTE — DISCHARGE INSTRUCTIONS
Patient Education   Patient Education        Counting Your Baby's Kicks: Care Instructions  Your Care Instructions     Counting your baby's kicks is one way your doctor can tell that your baby is healthy. Most women--especially in a first pregnancy--feel their baby move for the first time between 16 and 22 weeks. The movement may feel like flutters rather than kicks. Your baby may move more at certain times of the day. When you are active, you may notice less kicking than when you are resting. At your prenatal visits, your doctor will ask whether the baby is active. In your last trimester, your doctor may ask you to count the number of times you feel your baby move. Follow-up care is a key part of your treatment and safety. Be sure to make and go to all appointments, and call your doctor if you are having problems. It's also a good idea to know your test results and keep a list of the medicines you take. How do you count fetal kicks? · A common method of checking your baby's movement is to count the number of kicks or moves you feel in 1 hour. Ten movements (such as kicks, flutters, or rolls) in 1 hour are normal. Some doctors suggest that you count in the morning until you get to 10 movements. Then you can quit for that day and start again the next day. · Pick your baby's most active time of day to count. This may be any time from morning to evening. · If you do not feel 10 movements in an hour, your baby may be sleeping. Wait for the next hour and count again. When should you call for help? Call your doctor now or seek immediate medical care if:    · You noticed that your baby has stopped moving or is moving much less than normal.   Watch closely for changes in your health, and be sure to contact your doctor if you have any problems. Where can you learn more?   Go to http://www.gray.com/  Enter [id number] in the search box to learn more about \"Counting Your Baby's Kicks: Care Instructions. \"  Current as of: February 11, 2020               Content Version: 12.6  © 9995-6669 [employer]. Care instructions adapted under license by [employer] (which disclaims liability or warranty for this information). If you have questions about a medical condition or this instruction, always ask your healthcare professional. [address] any warranty or liability for your use of this information. Belly Pain in Pregnancy: Care Instructions  Your Care Instructions     When you're pregnant, any belly pain can be a worry. You may not want to call your doctor about every pain you have. But you don't want to miss something that is dangerous for you or your baby. Even if it feels familiar, belly pain can mean something new when you're pregnant. It's important to know when to call your doctor. It will also help to know how to care for yourself at home when your pain is not caused by anything harmful. · When belly pain is more severe or constant, see a doctor right away. · If you're sure your belly pain is a sign of labor, call your doctor. · When belly pain is brief, it's usually a normal part of pregnancy. It might be related to changes in the growing uterus. Or it could be the stretching of ligaments called round ligaments. These ligaments help support the uterus. Round ligament pain can be on either side of your belly. It can also be felt in your hips or groin. Follow-up care is a key part of your treatment and safety. Be sure to make and go to all appointments, and call your doctor if you are having problems. It's also a good idea to know your test results and keep a list of the medicines you take. How can you tell if belly pain is a sign of labor? When belly pain is caused by labor, it can feel like mild or menstrual-like cramps in your lower belly. These cramps are probably contractions. They can happen in your second or third trimester.   You may also have:  · A steady, dull ache in your lower back, pelvis, or thighs. · A feeling of pressure in your pelvis or lower belly. · Changes in your vaginal discharge or a sudden release of fluid from the vagina. If you think you are in labor, call your doctor. How can you care for yourself at home? When belly pain is mild and is not a symptom of labor:  · Rest until you feel better. · Take a warm bath. · Think about what you drink and eat:  ? Drink plenty of fluids. Choose water and other caffeine-free clear liquids until you feel better. ? Try eating small, frequent meals. If your stomach is upset, try bland, low-fat foods like plain rice, broiled chicken, toast, and yogurt. · Think about how you move if you are having brief pains from stretching of the round ligaments. ? Try gentle stretching. ? Move a little more slowly when turning in bed or getting up from a chair, so those ligaments don't stretch quickly. ? Lean forward a bit if you think you are going to cough or sneeze. When should you call for help? Call 911 anytime you think you may need emergency care. For example, call if:    · You have sudden, severe pain in your belly.     · You have severe vaginal bleeding.     · You passed out (lost consciousness).     · You have a seizure. Call your doctor now or seek immediate medical care if:    · You have new or worse belly pain or cramping.     · You have any vaginal bleeding.     · You have a fever.     · You have symptoms of preeclampsia, such as:  ? Sudden swelling of your face, hands, or feet. ? New vision problems (such as dimness, blurring, or seeing spots). ? A severe headache.     · You think that you may be in labor. This means that you've had at least 8 contractions within 1 hour or at least 4 contractions within 20 minutes, even after you change your position and drink fluids.     · You have symptoms of a urinary tract infection.  These may include:  ? Pain or burning when you urinate. ? A frequent need to urinate without being able to pass much urine. ? Pain in the flank, which is just below the rib cage and above the waist on either side of the back. ? Blood in your urine. Watch closely for changes in your health, and be sure to contact your doctor if you are worried about your or your baby's health. Where can you learn more? Go to http://www.hightower.com/  Enter B275 in the search box to learn more about \"Belly Pain in Pregnancy: Care Instructions. \"  Current as of: February 11, 2020               Content Version: 12.6  © 1106-0764 [employer]. Care instructions adapted under license by [employer] (which disclaims liability or warranty for this information). If you have questions about a medical condition or this instruction, always ask your healthcare professional. [address] any warranty or liability for your use of this information.

## 2021-01-01 NOTE — H&P
History and Physical    Patient: Michel Lacey MRN: [id number]  SSN: [id number]    YOB: 1980  Age: 47 y.o. Sex: female      Subjective:      Michel Lacey is a 47 y.o. female at 18 9/11 weeks [id number] who presents c/o uterine contractions that she has had for weeks, but started to become more painful starting last night. No bleeding, no LOF, +FM. Previous  X5 at term without complications. Ab X2. History of asthma. Past Medical History:   Diagnosis Date    Asthma      Pt has not needed an inhaler for a year.  Essential hypertension     Genital herpes     denies current outbreak    Psychiatric problem     depression     History reviewed. No pertinent surgical history. Family History   Problem Relation Age of Onset    Asthma Mother     Asthma Brother      Social History     Tobacco Use    Smoking status: Never Smoker    Smokeless tobacco: Never Used   Substance Use Topics    Alcohol use: No      Prior to Admission medications    Medication Sig Start Date End Date Taking? Authorizing Provider   albuterol (PROVENTIL HFA, VENTOLIN HFA, PROAIR HFA) 90 mcg/actuation inhaler Take 1 Puff by inhalation every six (6) hours as needed for Wheezing. 3/12/20   Flavio Mancilla NP        Allergies   Allergen Reactions    Lactose Diarrhea       Review of Systems:  A comprehensive review of systems was negative except for that written in the History of Present Illness. Objective:     Vitals:    21 1319   BP: 128/83   Pulse: 98   Resp: 16   Temp: 98.3 °F (36.8 °C)        Physical Exam:  AVSS  GENERAL: alert, cooperative, no distress, appears stated age  LUNG: clear to auscultation bilaterally  HEART: regular rate and rhythm, S1, S2 normal, no murmur, click, rub or gallop  ABDOMEN: gravid, soft, NT, ND  NEUROLOGIC: negative  FHT: 150s  [place]: none  SVE: cl/long high  FFN collected and sent  amni sure negative    Assessment:     A/ uterine cramping at 22 6/7 weeks.   No e/o labor or infection    Plan:     Urinalysis, tocometry, FFN. If urine and FNN negative will d/c home with precautions.       Signed By: Preet Montoya MD     January 1, 2021

## 2021-02-27 ENCOUNTER — HOSPITAL ENCOUNTER (EMERGENCY)
Age: 41
Discharge: HOME | End: 2021-02-28
Attending: OBSTETRICS & GYNECOLOGY | Admitting: OBSTETRICS & GYNECOLOGY
Payer: MEDICAID

## 2021-02-27 VITALS
SYSTOLIC BLOOD PRESSURE: 114 MMHG | BODY MASS INDEX: 35.51 KG/M2 | TEMPERATURE: 98.3 F | DIASTOLIC BLOOD PRESSURE: 69 MMHG | HEIGHT: 66 IN | RESPIRATION RATE: 16 BRPM | HEART RATE: 94 BPM

## 2021-02-27 DIAGNOSIS — R06.2 WHEEZING: ICD-10-CM

## 2021-02-27 PROCEDURE — 75810000275 HC EMERGENCY DEPT VISIT NO LEVEL OF CARE

## 2021-02-27 RX ORDER — ACETAMINOPHEN 325 MG/1
500 TABLET ORAL
COMMUNITY

## 2021-02-28 PROBLEM — O10.013 PRE-EXISTING ESSENTIAL HYPERTENSION COMPLICATING PREGNANCY IN THIRD TRIMESTER: Status: RESOLVED | Noted: 2021-02-28 | Resolved: 2021-02-28

## 2021-02-28 PROBLEM — K92.0 HEMATEMESIS: Status: ACTIVE | Noted: 2021-02-28

## 2021-02-28 PROBLEM — O10.013 PRE-EXISTING ESSENTIAL HYPERTENSION COMPLICATING PREGNANCY IN THIRD TRIMESTER: Status: ACTIVE | Noted: 2021-02-28

## 2021-02-28 PROBLEM — Z3A.31 31 WEEKS GESTATION OF PREGNANCY: Status: ACTIVE | Noted: 2021-02-28

## 2021-02-28 LAB
ALBUMIN SERPL-MCNC: 2.8 G/DL (ref 3.5–5)
ALBUMIN/GLOB SERPL: 0.8 {RATIO} (ref 1.1–2.2)
ALP SERPL-CCNC: 105 U/L (ref 45–117)
ALT SERPL-CCNC: 10 U/L (ref 12–78)
ANION GAP SERPL CALC-SCNC: 8 MMOL/L (ref 5–15)
APPEARANCE UR: ABNORMAL
AST SERPL-CCNC: 7 U/L (ref 15–37)
BACTERIA URNS QL MICRO: ABNORMAL /HPF
BASOPHILS # BLD: 0 K/UL (ref 0–0.1)
BASOPHILS NFR BLD: 0 % (ref 0–1)
BILIRUB SERPL-MCNC: 0.2 MG/DL (ref 0.2–1)
BILIRUB UR QL: NEGATIVE
BUN SERPL-MCNC: 9 MG/DL (ref 6–20)
BUN/CREAT SERPL: 24 (ref 12–20)
CALCIUM SERPL-MCNC: 8.9 MG/DL (ref 8.5–10.1)
CHLORIDE SERPL-SCNC: 108 MMOL/L (ref 97–108)
CO2 SERPL-SCNC: 22 MMOL/L (ref 21–32)
COLOR UR: ABNORMAL
CREAT SERPL-MCNC: 0.38 MG/DL (ref 0.55–1.02)
CREAT UR-MCNC: 175 MG/DL
DIFFERENTIAL METHOD BLD: ABNORMAL
EOSINOPHIL # BLD: 0.4 K/UL (ref 0–0.4)
EOSINOPHIL NFR BLD: 4 % (ref 0–7)
EPITH CASTS URNS QL MICRO: ABNORMAL /LPF
ERYTHROCYTE [DISTWIDTH] IN BLOOD BY AUTOMATED COUNT: 14.8 % (ref 11.5–14.5)
GLOBULIN SER CALC-MCNC: 3.6 G/DL (ref 2–4)
GLUCOSE SERPL-MCNC: 90 MG/DL (ref 65–100)
GLUCOSE UR STRIP.AUTO-MCNC: NEGATIVE MG/DL
HCT VFR BLD AUTO: 34 % (ref 35–47)
HGB BLD-MCNC: 11 G/DL (ref 11.5–16)
HGB UR QL STRIP: NEGATIVE
IMM GRANULOCYTES # BLD AUTO: 0 K/UL (ref 0–0.04)
IMM GRANULOCYTES NFR BLD AUTO: 0 % (ref 0–0.5)
KETONES UR QL STRIP.AUTO: 15 MG/DL
LEUKOCYTE ESTERASE UR QL STRIP.AUTO: ABNORMAL
LYMPHOCYTES # BLD: 2.8 K/UL (ref 0.8–3.5)
LYMPHOCYTES NFR BLD: 27 % (ref 12–49)
MCH RBC QN AUTO: 27.6 PG (ref 26–34)
MCHC RBC AUTO-ENTMCNC: 32.4 G/DL (ref 30–36.5)
MCV RBC AUTO: 85.2 FL (ref 80–99)
MONOCYTES # BLD: 1 K/UL (ref 0–1)
MONOCYTES NFR BLD: 10 % (ref 5–13)
NEUTS SEG # BLD: 6.1 K/UL (ref 1.8–8)
NEUTS SEG NFR BLD: 59 % (ref 32–75)
NITRITE UR QL STRIP.AUTO: NEGATIVE
NRBC # BLD: 0 K/UL (ref 0–0.01)
NRBC BLD-RTO: 0 PER 100 WBC
PH UR STRIP: 7 [PH] (ref 5–8)
PLATELET # BLD AUTO: 310 K/UL (ref 150–400)
PMV BLD AUTO: 10.4 FL (ref 8.9–12.9)
POTASSIUM SERPL-SCNC: 3.7 MMOL/L (ref 3.5–5.1)
PROT SERPL-MCNC: 6.4 G/DL (ref 6.4–8.2)
PROT UR STRIP-MCNC: ABNORMAL MG/DL
PROT UR-MCNC: 30 MG/DL (ref 0–11.9)
PROT/CREAT UR-RTO: 0.2
RBC # BLD AUTO: 3.99 M/UL (ref 3.8–5.2)
RBC #/AREA URNS HPF: ABNORMAL /HPF (ref 0–5)
SODIUM SERPL-SCNC: 138 MMOL/L (ref 136–145)
SP GR UR REFRACTOMETRY: 1.02 (ref 1–1.03)
UA: UC IF INDICATED,UAUC: ABNORMAL
UROBILINOGEN UR QL STRIP.AUTO: 1 EU/DL (ref 0.2–1)
WBC # BLD AUTO: 10.4 K/UL (ref 3.6–11)
WBC URNS QL MICRO: ABNORMAL /HPF (ref 0–4)

## 2021-02-28 PROCEDURE — 85025 COMPLETE CBC W/AUTO DIFF WBC: CPT

## 2021-02-28 PROCEDURE — 81001 URINALYSIS AUTO W/SCOPE: CPT

## 2021-02-28 PROCEDURE — 84156 ASSAY OF PROTEIN URINE: CPT

## 2021-02-28 PROCEDURE — 2709999900 HC NON-CHARGEABLE SUPPLY

## 2021-02-28 PROCEDURE — 80053 COMPREHEN METABOLIC PANEL: CPT

## 2021-02-28 PROCEDURE — 99284 EMERGENCY DEPT VISIT MOD MDM: CPT

## 2021-02-28 PROCEDURE — 59025 FETAL NON-STRESS TEST: CPT

## 2021-02-28 PROCEDURE — 36415 COLL VENOUS BLD VENIPUNCTURE: CPT

## 2021-02-28 RX ORDER — ALBUTEROL SULFATE 90 UG/1
1 AEROSOL, METERED RESPIRATORY (INHALATION)
Qty: 1 INHALER | Refills: 4 | Status: SHIPPED | OUTPATIENT
Start: 2021-02-28

## 2021-02-28 NOTE — PROGRESS NOTES
2323  41yo  with kimberly of 2021 to room 205 with complaints of contractions \"back to back\" for three days that have been lasting 2 minutes. Pt also reports vomiting x2 today, with the last time showing some blood. Pt has not been to see Dr Katie Mireles since 2020. Pt has a history of essential hypertension, but admits to being non compliant with taking her medication. Pt admits to being depressed and states that Dr Katie Mireles prescribed her medication, but the pt has never filled it and refuses to take it due to it due to how it makes her feel. Pt reports talking with a counselor every Wednesday at noon. Pt denies bleeding or leaking of fluid from the vagina. Pt oriented to room and call system. 2349 Dr Hagan at the bedside to evaluate patient. 0215 Pt sleeping    0320 Discharge instructions reviewed with patient, pt verbalized understanding and signed instruction sheet. Pt verbalized understanding of need to make an appointment to be seen by Dr Katie Mireles and keeping the appointment. 0329 Pt discharged home ambulatory with family.

## 2021-02-28 NOTE — H&P
History & Physical    Name: Michel Lacey MRN: [id number]  SSN: [id number]    YOB: 1980  Age: 47 y.o. Sex: female        Subjective:   Chief Complaint: Hematemesis  Estimated Date of Delivery: 21  OB History    Para Term  AB Living   5 4 4     4   SAB TAB Ectopic Molar Multiple Live Births           0 4      # Outcome Date GA Lbr Jj/2nd Weight Sex Delivery Anes PTL Lv   5 Current            4 Term 17 40w2d  3.14 kg F Vag-Spont EPIDURAL AN  CAMILLE   3 Term     M  EPI N CAMILLE   2 Term     M Vag-Spont EPI N CAMILLE   1 Term     F Vag-Spont EPI N CAMILLE       Michel Lacey, 47 y.o.,  ,  presents at 27w3d, complaining of hematemesis. She vomited twice tonight, and noticed blood in her emesis. She's never had that happen. She has reflux, and some epigastric pain. She is anxious and has many symptoms of depression. She is talking to a counselor, but did not start her antidepressant. She needs an appointment. She reports hypertension at home, with systolics in the 823'T and diastolics in the 59'D. She was diagnosed with hypertension abaout a year prior to this pregnancy, but did not take the antihypertensive prescribed. She is very worried about COVID-19, which is why she has not been back for prenatal care. Please see prenatal records for details. Allergies   Allergen Reactions    Lactose Diarrhea       Prior to Admission medications    Medication Sig Start Date End Date Taking? Authorizing Provider   albuterol (PROVENTIL HFA, VENTOLIN HFA, PROAIR HFA) 90 mcg/actuation inhaler Take 1 Puff by inhalation every six (6) hours as needed for Wheezing. 21  Yes Beni Scott MD   acetaminophen (TylenoL) 325 mg tablet Take 500 mg by mouth every four (4) hours as needed for Pain. Indications: pain   Yes Provider, Historical       Past Medical History:   Diagnosis Date    Asthma      Pt has not needed an inhaler for a year.     Essential hypertension     Genital herpes denies current outbreak    Psychiatric problem     depression       History reviewed. No pertinent surgical history. Social History     Occupational History    Not on file   Tobacco Use    Smoking status: Never Smoker    Smokeless tobacco: Never Used   Substance and Sexual Activity    Alcohol use: No    Drug use: No    Sexual activity: Yes     Partners: Male     Birth control/protection: None       Family History   Problem Relation Age of Onset    Asthma Mother     Asthma Brother        Review of Systems: A comprehensive review of systems was negative except for that written in the HPI. Objective:     Physical Exam:    Visit Vitals  /69   Pulse 94   Temp 98.3 °F (36.8 °C)   Resp 16   Ht 5' 6\" (1.676 m)   BMI 35.51 kg/m²       General:   47 y.o.  female who appears to be in no acute distress. HEENT:   Normocephalic. Pupils are equal, round, and reactive to light. Extraocular movements are intact. Pharynx is clear. Neck:   Normal range of motion. No thyromegaly. Heart:   Regular rate and rhythm. No murmurs present. Lungs:   Clear, no rales, rhonchi, or wheezes. Abdomen:   Soft, gravid, not tender, normal bowel sounds. No CVA tenderness   Leopold's: vertex   Uterine Activity:    Frequency: None  Fetal Heart Rate:     Baseline: 135 bpm    Variability: Moderate    Accelerations: Present (15 x 15 bpm)    Decelerations: None  Category: 1  Pelvic:    Membranes: Intact  Extremites:   Trace bilateral pedal edema. Full range of motion. Neuro:    Alert. Oriented. CN 2 - 12 intact.   Motor and sensory exam grossly normal.      Prenatal Labs   Lab Results   Component Value Date/Time    Rubella, External Immune 2020    GrBStrep, External Negative 2017    HBsAg, External Negative 2020    HIV, External Negative 2020    RPR, External Non- Reactive 2020    Gonorrhea, External negative 2016    Chlamydia, External negative 2016    ABO,Rh O positive 10/05/2016     Recent Results (from the past 12 hour(s))   METABOLIC PANEL, COMPREHENSIVE    Collection Time: 02/28/21  1:01 AM   Result Value Ref Range    Sodium 138 136 - 145 mmol/L    Potassium 3.7 3.5 - 5.1 mmol/L    Chloride 108 97 - 108 mmol/L    CO2 22 21 - 32 mmol/L    Anion gap 8 5 - 15 mmol/L    Glucose 90 65 - 100 mg/dL    BUN 9 6 - 20 MG/DL    Creatinine 0.38 (L) 0.55 - 1.02 MG/DL    BUN/Creatinine ratio 24 (H) 12 - 20      GFR est AA >60 >60 ml/min/1.73m2    GFR est non-AA >60 >60 ml/min/1.73m2    Calcium 8.9 8.5 - 10.1 MG/DL    Bilirubin, total 0.2 0.2 - 1.0 MG/DL    ALT (SGPT) 10 (L) 12 - 78 U/L    AST (SGOT) 7 (L) 15 - 37 U/L    Alk. phosphatase 105 45 - 117 U/L    Protein, total 6.4 6.4 - 8.2 g/dL    Albumin 2.8 (L) 3.5 - 5.0 g/dL    Globulin 3.6 2.0 - 4.0 g/dL    A-G Ratio 0.8 (L) 1.1 - 2.2     CBC WITH AUTOMATED DIFF    Collection Time: 02/28/21  1:01 AM   Result Value Ref Range    WBC 10.4 3.6 - 11.0 K/uL    RBC 3.99 3.80 - 5.20 M/uL    HGB 11.0 (L) 11.5 - 16.0 g/dL    HCT 34.0 (L) 35.0 - 47.0 %    MCV 85.2 80.0 - 99.0 FL    MCH 27.6 26.0 - 34.0 PG    MCHC 32.4 30.0 - 36.5 g/dL    RDW 14.8 (H) 11.5 - 14.5 %    PLATELET 404 870 - 653 K/uL    MPV 10.4 8.9 - 12.9 FL    NRBC 0.0 0  WBC    ABSOLUTE NRBC 0.00 0.00 - 0.01 K/uL    NEUTROPHILS 59 32 - 75 %    LYMPHOCYTES 27 12 - 49 %    MONOCYTES 10 5 - 13 %    EOSINOPHILS 4 0 - 7 %    BASOPHILS 0 0 - 1 %    IMMATURE GRANULOCYTES 0 0.0 - 0.5 %    ABS. NEUTROPHILS 6.1 1.8 - 8.0 K/UL    ABS. LYMPHOCYTES 2.8 0.8 - 3.5 K/UL    ABS. MONOCYTES 1.0 0.0 - 1.0 K/UL    ABS. EOSINOPHILS 0.4 0.0 - 0.4 K/UL    ABS. BASOPHILS 0.0 0.0 - 0.1 K/UL    ABS. IMM. GRANS. 0.0 0.00 - 0.04 K/UL    DF AUTOMATED     PROTEIN/CREATININE RATIO, URINE    Collection Time: 02/28/21  1:22 AM   Result Value Ref Range    Protein, urine random 30 (H) 0.0 - 11.9 mg/dL    Creatinine, urine 175.00 mg/dL    Protein/Creat.  urine Ratio 0.2     URINALYSIS W/ REFLEX CULTURE    Collection Time: 02/28/21  1:22 AM    Specimen: Urine   Result Value Ref Range    Color YELLOW/STRAW      Appearance CLOUDY (A) CLEAR      Specific gravity 1.021 1.003 - 1.030      pH (UA) 7.0 5.0 - 8.0      Protein TRACE (A) NEG mg/dL    Glucose Negative NEG mg/dL    Ketone 15 (A) NEG mg/dL    Bilirubin Negative NEG      Blood Negative NEG      Urobilinogen 1.0 0.2 - 1.0 EU/dL    Nitrites Negative NEG      Leukocyte Esterase LARGE (A) NEG      WBC 0-4 0 - 4 /hpf    RBC 0-5 0 - 5 /hpf    Epithelial cells FEW FEW /lpf    Bacteria 1+ (A) NEG /hpf    UA:UC IF INDICATED CULTURE NOT INDICATED BY UA RESULT CNI           Assessment/Plan:     Patient Active Problem List   Diagnosis Code    Severe obesity (HCC) E66.01    Mild intermittent asthma without complication U75.39    Vaginal bleeding N93.9    Hematemesis K92.0    Pre-existing essential hypertension complicating pregnancy in third trimester O10.013    31 weeks gestation of pregnancy Z3A.31       Plan:    Pre-existing essential hypertension complicating pregnancy in third trimester  CBC CMP urine Pro/cr ratio    Hematemesis  Resolved. I doubt PUD. It is probably from the vomiting. I reassured her. Discharge home  Encouraged prenatal care.     Mild intermittent asthma without complication  I renewed her albuterol inhaler    Sid Pelaez MD  2/28/2021

## 2021-02-28 NOTE — ASSESSMENT & PLAN NOTE
Resolved. I doubt PUD. It is probably from the vomiting. I reassured her. Discharge home  Encouraged prenatal care.

## 2021-02-28 NOTE — DISCHARGE INSTRUCTIONS
Patient Education   Patient Education        High Blood Pressure in Pregnancy: Care Instructions  Your Care Instructions     High blood pressure (hypertension) means that the force of blood against your artery walls is too strong. Mild high blood pressure during pregnancy is not usually dangerous. Your doctor will probably just want to watch you closely. But when blood pressure is very high, it can reduce oxygen to your baby. This can affect how well your baby grows. High blood pressure also means that you are at higher risk for:  · Preeclampsia. This is a problem that includes high blood pressure and damage to your liver or kidneys. It can also reduce how much oxygen your baby gets. In some cases, it leads to eclampsia. Eclampsia causes seizures. · Placental abruption. This is a problem when the placenta separates from the uterus before birth. It prevents the baby from getting enough oxygen and nutrients. Sometimes it can cause death for the baby and the mother. To prevent problems for you or your baby, you will have to check your blood pressure often. You will do this until after your baby is born. If your blood pressure rises suddenly or is very high during your pregnancy, your doctor may prescribe medicines. They can usually control blood pressure. If your blood pressure affects your or your baby's health, your doctor may need to deliver your baby early. After your baby is born, your blood pressure will probably improve. But sometimes blood pressure problems continue after birth. Follow-up care is a key part of your treatment and safety. Be sure to make and go to all appointments, and call your doctor if you are having problems. It's also a good idea to know your test results and keep a list of the medicines you take. How can you care for yourself at home? · Take and write down your blood pressure at home if your doctor says to. · Take your medicines exactly as prescribed.  Call your doctor if you think you are having a problem with your medicine. · Do not smoke. This is one of the best things you can do to help your baby be healthy. If you need help quitting, talk to your doctor about stop-smoking programs and medicines. These can increase your chances of quitting for good. · Don't gain too much weight during pregnancy. Talk to your doctor about how much weight gain is healthy. · Eat a healthy diet. · If your doctor says it's okay, get regular exercise. Walking or swimming several times a week can be healthy for you and your baby. · Reduce stress, and find time to relax. This is very important if you continue to work or have a busy schedule. It's also important if you have small children at home. When should you call for help? Call 911 anytime you think you may need emergency care. For example, call if:    · You passed out (lost consciousness).     · You have a seizure. Call your doctor now or seek immediate medical care if:    · You have symptoms of preeclampsia, such as:  ? Sudden swelling of your face, hands, or feet. ? New vision problems (such as dimness, blurring, or seeing spots). ? A severe headache.     · Your blood pressure is higher than it should be or rises suddenly.     · You have new nausea or vomiting.     · You think that you are in labor.     · You have pain in your belly or pelvis. Watch closely for changes in your health, and be sure to contact your doctor if:    · You gain weight rapidly. Where can you learn more? Go to http://www.gray.com/  Enter A052 in the search box to learn more about \"High Blood Pressure in Pregnancy: Care Instructions. \"  Current as of: February 11, 2020               Content Version: 12.6  © 5208-7416 [employer]. Care instructions adapted under license by [employer] (which disclaims liability or warranty for this information).  If you have questions about a medical condition or this instruction, always ask your healthcare professional. [place] [address] any warranty or liability for your use of this information. Weeks 30 to 32 of Your Pregnancy: Care Instructions  Your Care Instructions     You have made it to the final months of your pregnancy. By now, your baby is really starting to look like a baby, with hair and plump skin. As you enter the final weeks of pregnancy, the reality of having a baby may start to set in. This is the time to settle on a name, get your household in order, set up a safe nursery, and find quality  if needed. Doing these things in advance will allow you to focus on caring for and enjoying your new baby. You may also want to have a tour of your hospital's labor and delivery unit to get a better idea of what to expect while you are in the hospital.  During these last months, it is very important to take good care of yourself and pay attention to what your body needs. If your doctor says it is okay for you to work, don't push yourself too hard. Use the tips provided in this care sheet to ease heartburn and care for varicose veins. If you haven't already had the Tdap shot during this pregnancy, talk to your doctor about getting it. It will help protect your  against pertussis infection. Follow-up care is a key part of your treatment and safety. Be sure to make and go to all appointments, and call your doctor if you are having problems. It's also a good idea to know your test results and keep a list of the medicines you take. How can you care for yourself at home? Pay attention to your baby's movements  · You should feel your baby move several times every day. · Your baby now turns less, and kicks and jabs more. · Your baby sleeps 20 to 45 minutes at a time and is more active at certain times of day. · If your doctor wants you to count your baby's kicks:  ? Empty your bladder, and lie on your side or relax in a comfortable chair.   ? Write down your start time. ? Pay attention only to your baby's movements. Count any movement except hiccups. ? After you have counted 10 movements, write down your stop time. ? Write down how many minutes it took for your baby to move 10 times. ? If an hour goes by and you have not recorded 10 movements, have something to eat or drink and then count for another hour. If you do not record 10 movements in either hour, call your doctor. Ease heartburn  · Eat small, frequent meals. · Do not eat chocolate, peppermint, or very spicy foods. Avoid drinks with caffeine, such as coffee, tea, and sodas. · Avoid bending over or lying down after meals. · Talk a short walk after you eat. · If heartburn is a problem at night, do not eat for 2 hours before bedtime. · Take antacids like Mylanta, Maalox, Rolaids, or Tums. Do not take antacids that have sodium bicarbonate. Care for varicose veins  · Varicose veins are blood vessels that stretch out with the extra blood during pregnancy. Your legs may ache or throb. Most varicose veins will go away after the birth. · Avoid standing for long periods of time. Sit with your legs crossed at the ankles, not the knees. · Sit with your feet propped up. · Avoid tight clothing or stockings. Wear support hose. · Exercise regularly. Try walking for at least 30 minutes a day. Where can you learn more? Go to http://www.gray.com/  Enter X471 in the search box to learn more about \"Weeks 30 to 32 of Your Pregnancy: Care Instructions. \"  Current as of: February 11, 2020               Content Version: 12.6  © 9523-2310 [employer]. Care instructions adapted under license by [employer] (which disclaims liability or warranty for this information).  If you have questions about a medical condition or this instruction, always ask your healthcare professional. [address] any warranty or liability for your use of this information.

## 2021-03-27 ENCOUNTER — HOSPITAL ENCOUNTER (EMERGENCY)
Age: 41
Discharge: HOME | End: 2021-03-27
Admitting: OBSTETRICS & GYNECOLOGY
Payer: MEDICAID

## 2021-03-27 VITALS
TEMPERATURE: 98.3 F | SYSTOLIC BLOOD PRESSURE: 145 MMHG | HEART RATE: 108 BPM | WEIGHT: 234.8 LBS | BODY MASS INDEX: 37.73 KG/M2 | HEIGHT: 66 IN | RESPIRATION RATE: 16 BRPM | DIASTOLIC BLOOD PRESSURE: 69 MMHG

## 2021-03-27 PROCEDURE — 99283 EMERGENCY DEPT VISIT LOW MDM: CPT

## 2021-03-27 PROCEDURE — 75810000275 HC EMERGENCY DEPT VISIT NO LEVEL OF CARE

## 2021-03-27 NOTE — PROGRESS NOTES
1808-Dr. Carpio at bedside    1812-Speculum exam, no fluid, VE, Cervix 1 cm.  Orders received to discharge patient home, keep follow up appt with Dr. Mike Chapin next week    1835-Pt ambulated off unit in stable condition with daughter at side

## 2021-03-27 NOTE — H&P
History & Physical    Name: Key Brown MRN: [id number]  SSN: [id number]    YOB: 1980  Age: 47 y.o. Sex: female      Subjective:     Reason for Admission:  Pregnancy and possible spotting    History of Present Illness: Key Brown is a 47 y.o.  female with an estimated gestational age of 29w0d with Estimated Date of Delivery: 21. Patient complains of pink tinge on toilet paper for 1 days. Pregnancy has been complicated by advanced maternal age. Patient denies abdominal pain  , contractions, fever, headache , nausea and vomiting, swelling, vaginal leaking of fluid  and visual disturbances. Last ultrasound few days ago by Dr Thom Song with no evidence of previa. OB History        5    Para   4    Term   4            AB        Living   4       SAB        TAB        Ectopic        Molar        Multiple   0    Live Births   4              Past Medical History:   Diagnosis Date    Asthma      Pt has not needed an inhaler for a year.  Essential hypertension     Genital herpes     denies current outbreak    Psychiatric problem     depression     No past surgical history on file. Social History     Occupational History    Not on file   Tobacco Use    Smoking status: Never Smoker    Smokeless tobacco: Never Used   Substance and Sexual Activity    Alcohol use: No    Drug use: No    Sexual activity: Yes     Partners: Male     Birth control/protection: None     Family History   Problem Relation Age of Onset    Asthma Mother     Asthma Brother        Allergies   Allergen Reactions    Lactose Diarrhea     Prior to Admission medications    Medication Sig Start Date End Date Taking?  Authorizing Provider   albuterol (PROVENTIL HFA, VENTOLIN HFA, PROAIR HFA) 90 mcg/actuation inhaler Take 1 Puff by inhalation every six (6) hours as needed for Wheezing. 21   Es Carballo MD   acetaminophen (TylenoL) 325 mg tablet Take 500 mg by mouth every four (4) hours as needed for Pain. Indications: pain    Provider, Historical        Review of Systems    Objective:     Vitals:    Vitals:    21 1742 21 1749   BP: (!) 145/69    Pulse: (!) 108    Resp: 16    Temp: 98.3 °F (36.8 °C)    Weight:  106.5 kg (234 lb 12.8 oz)   Height:  5' 6\" (1.676 m)      Temp (24hrs), Av.3 °F (36.8 °C), Min:98.3 °F (36.8 °C), Max:98.3 °F (36.8 °C)    BP  Min: 145/69  Max: 145/69     Physical Exam  SSE with no evidence of pool, nitrazine neg, no blood  Cervical Exam: Mireles Score: 1/long -4  Uterine Activity: None  irritable  Membranes: Intact  Fetal Heart Rate: Reactive       Labs: No results found for this or any previous visit (from the past 24 hour(s)).     Patient Active Problem List   Diagnosis Code    Severe obesity ([facility] [address] E66.01    Mild intermittent asthma without complication Y54.33    Vaginal bleeding N93.9    Hematemesis K92.0    Pre-existing essential hypertension complicating pregnancy in third trimester O10.013    31 weeks gestation of pregnancy Z3A.31     Assessment and Plan:     False labor, no evidence of bleeding noted  Discharge home with labor precuations      Signed By:  Pat Vee MD     2021                 joanie

## 2021-03-27 NOTE — DISCHARGE INSTRUCTIONS
Patient Education   Patient Education   Keep follow up appt for next week with Dr. Annamarie Humphrey 34 to 39 of Your Pregnancy: Care Instructions  Your Care Instructions     By now, your baby and your belly have grown quite large. It is almost time to give birth. Your baby's lungs are almost ready to breathe air. The bones in your baby's head are now firm enough to protect it, but soft enough to move down through the birth canal.  You may feel excited, happy, anxious, or scared. You may wonder how you will know if you are in labor or what to expect during labor. Try to be flexible in your expectations of the birth. Because each birth is different, there is no way to know exactly what childbirth will be like for you. This care sheet will help you know what to expect and how to prepare. This may make your childbirth easier. If you haven't already had the Tdap shot during this pregnancy, talk to your doctor about getting it. It will help protect your  against pertussis infection. In the 36th week, most women have a test for group B streptococcus (GBS). GBS is a common bacteria that can live in the vagina and rectum. It can make your baby sick after birth. If you test positive, you will get antibiotics during labor. The medicine will keep your baby from getting the bacteria. Follow-up care is a key part of your treatment and safety. Be sure to make and go to all appointments, and call your doctor if you are having problems. It's also a good idea to know your test results and keep a list of the medicines you take. How can you care for yourself at home? Learn about pain relief choices  · Pain is different for every woman. Talk with your doctor about your feelings about pain. · You can choose from several types of pain relief. These include medicine or breathing techniques, as well as comfort measures. You can use more than one option.   · If you choose to have pain medicine during labor, talk to your doctor about your options. Some medicines lower anxiety and help with some of the pain. Others make your lower body numb so that you won't feel pain. · Be sure to tell your doctor about your pain medicine choice before you start labor or very early in your labor. You may be able to change your mind as labor progresses. · Rarely, a woman is put to sleep by medicine given through a mask or an IV. Labor and delivery  · The first stage of labor has three parts: early, active, and transition. ? Most women have early labor at home. You can stay busy or rest, eat light snacks, drink clear fluids, and start counting contractions. ? When talking during a contraction gets hard, you may be moving to active labor. During active labor, you should head for the hospital if you are not there already. ? You are in active labor when contractions come every 3 to 4 minutes and last about 60 seconds. Your cervix is opening more rapidly. ? If your water breaks, contractions will come faster and stronger. ? During transition, your cervix is stretching, and contractions are coming more rapidly. ? You may want to push, but your cervix might not be ready. Your doctor will tell you when to push. · The second stage starts when your cervix is completely opened and you are ready to push. ? Contractions are very strong to push the baby down the birth canal.  ? You will feel the urge to push. You may feel like you need to have a bowel movement. ? You may be coached to push with contractions. These contractions will be very strong, but you will not have them as often. You can get a little rest between contractions. ? You may be emotional and irritable. You may not be aware of what is going on around you.  ? One last push, and your baby is born. · The third stage is when a few more contractions push out the placenta. This may take 30 minutes or less. · The fourth stage is the welcome recovery.  You may feel overwhelmed with emotions and exhausted but alert. This is a good time to start breastfeeding. Where can you learn more? Go to http://www.gray.com/  Enter B912 in the search box to learn more about \"Weeks 34 to 36 of Your Pregnancy: Care Instructions. \"  Current as of: February 11, 2020               Content Version: 12.6  © 2283-0458 [email or web address]. Care instructions adapted under license by [employer] (which disclaims liability or warranty for this information). If you have questions about a medical condition or this instruction, always ask your healthcare professional. [address] any warranty or liability for your use of this information. Counting Your Baby's Kicks: Care Instructions  Your Care Instructions     Counting your baby's kicks is one way your doctor can tell that your baby is healthy. Most women--especially in a first pregnancy--feel their baby move for the first time between 16 and 22 weeks. The movement may feel like flutters rather than kicks. Your baby may move more at certain times of the day. When you are active, you may notice less kicking than when you are resting. At your prenatal visits, your doctor will ask whether the baby is active. In your last trimester, your doctor may ask you to count the number of times you feel your baby move. Follow-up care is a key part of your treatment and safety. Be sure to make and go to all appointments, and call your doctor if you are having problems. It's also a good idea to know your test results and keep a list of the medicines you take. How do you count fetal kicks? · A common method of checking your baby's movement is to count the number of kicks or moves you feel in 1 hour. Ten movements (such as kicks, flutters, or rolls) in 1 hour are normal. Some doctors suggest that you count in the morning until you get to 10 movements. Then you can quit for that day and start again the next day.   · Pick your baby's most active time of day to count. This may be any time from morning to evening. · If you do not feel 10 movements in an hour, your baby may be sleeping. Wait for the next hour and count again. When should you call for help? Call your doctor now or seek immediate medical care if:    · You noticed that your baby has stopped moving or is moving much less than normal.   Watch closely for changes in your health, and be sure to contact your doctor if you have any problems. Where can you learn more? Go to http://www.gray.com/  Enter [id number] in the search box to learn more about \"Counting Your Baby's Kicks: Care Instructions. \"  Current as of: February 11, 2020               Content Version: 12.6  © 4788-3599 [employer]. Care instructions adapted under license by [employer] (which disclaims liability or warranty for this information). If you have questions about a medical condition or this instruction, always ask your healthcare professional. [address] any warranty or liability for your use of this information.

## 2021-04-07 ENCOUNTER — HOSPITAL ENCOUNTER (OUTPATIENT)
Age: 41
Discharge: HOME | End: 2021-04-08
Attending: OBSTETRICS & GYNECOLOGY | Admitting: OBSTETRICS & GYNECOLOGY
Payer: MEDICAID

## 2021-04-07 PROCEDURE — 75810000275 HC EMERGENCY DEPT VISIT NO LEVEL OF CARE

## 2021-04-08 VITALS
HEART RATE: 85 BPM | RESPIRATION RATE: 15 BRPM | TEMPERATURE: 98.3 F | DIASTOLIC BLOOD PRESSURE: 76 MMHG | BODY MASS INDEX: 37.12 KG/M2 | WEIGHT: 231 LBS | SYSTOLIC BLOOD PRESSURE: 134 MMHG | HEIGHT: 66 IN

## 2021-04-08 PROBLEM — O47.03 FALSE LABOR BEFORE 37 COMPLETED WEEKS OF GESTATION DURING PREGNANCY IN THIRD TRIMESTER, ANTEPARTUM: Status: ACTIVE | Noted: 2021-04-08

## 2021-04-08 PROBLEM — E66.01 SEVERE OBESITY (HCC): Status: RESOLVED | Noted: 2020-03-04 | Resolved: 2021-04-08

## 2021-04-08 PROBLEM — K92.0 HEMATEMESIS: Status: RESOLVED | Noted: 2021-02-28 | Resolved: 2021-04-08

## 2021-04-08 PROBLEM — O99.513 RESPIRATORY SYSTEM DISEASE COMPLICATING PREGNANCY IN THIRD TRIMESTER: Status: ACTIVE | Noted: 2021-04-08

## 2021-04-08 PROBLEM — Z3A.31 31 WEEKS GESTATION OF PREGNANCY: Status: RESOLVED | Noted: 2021-02-28 | Resolved: 2021-04-08

## 2021-04-08 PROBLEM — O09.523 MATERNAL AGE > 35, MULTIGRAVIDA, THIRD TRIMESTER: Status: ACTIVE | Noted: 2021-04-08

## 2021-04-08 PROBLEM — Z3A.36 36 WEEKS GESTATION OF PREGNANCY: Status: ACTIVE | Noted: 2021-04-08

## 2021-04-08 PROBLEM — N93.9 VAGINAL BLEEDING: Status: RESOLVED | Noted: 2020-11-15 | Resolved: 2021-04-08

## 2021-04-08 LAB
ALBUMIN SERPL-MCNC: 2.7 G/DL (ref 3.5–5)
ALBUMIN/GLOB SERPL: 0.7 {RATIO} (ref 1.1–2.2)
ALP SERPL-CCNC: 149 U/L (ref 45–117)
ALT SERPL-CCNC: 13 U/L (ref 12–78)
ANION GAP SERPL CALC-SCNC: 10 MMOL/L (ref 5–15)
AST SERPL-CCNC: 8 U/L (ref 15–37)
BILIRUB SERPL-MCNC: 0.3 MG/DL (ref 0.2–1)
BUN SERPL-MCNC: 8 MG/DL (ref 6–20)
BUN/CREAT SERPL: 19 (ref 12–20)
CALCIUM SERPL-MCNC: 8.7 MG/DL (ref 8.5–10.1)
CHLORIDE SERPL-SCNC: 107 MMOL/L (ref 97–108)
CO2 SERPL-SCNC: 20 MMOL/L (ref 21–32)
CREAT SERPL-MCNC: 0.42 MG/DL (ref 0.55–1.02)
CREAT UR-MCNC: 175 MG/DL
ERYTHROCYTE [DISTWIDTH] IN BLOOD BY AUTOMATED COUNT: 15.7 % (ref 11.5–14.5)
GLOBULIN SER CALC-MCNC: 3.7 G/DL (ref 2–4)
GLUCOSE SERPL-MCNC: 131 MG/DL (ref 65–100)
HCT VFR BLD AUTO: 34 % (ref 35–47)
HGB BLD-MCNC: 10.9 G/DL (ref 11.5–16)
MCH RBC QN AUTO: 27.1 PG (ref 26–34)
MCHC RBC AUTO-ENTMCNC: 32.1 G/DL (ref 30–36.5)
MCV RBC AUTO: 84.6 FL (ref 80–99)
NRBC # BLD: 0 K/UL (ref 0–0.01)
NRBC BLD-RTO: 0 PER 100 WBC
PLATELET # BLD AUTO: 324 K/UL (ref 150–400)
PMV BLD AUTO: 10.4 FL (ref 8.9–12.9)
POTASSIUM SERPL-SCNC: 3.7 MMOL/L (ref 3.5–5.1)
PROT SERPL-MCNC: 6.4 G/DL (ref 6.4–8.2)
PROT UR-MCNC: 37 MG/DL (ref 0–11.9)
PROT/CREAT UR-RTO: 0.2
RBC # BLD AUTO: 4.02 M/UL (ref 3.8–5.2)
SODIUM SERPL-SCNC: 137 MMOL/L (ref 136–145)
WBC # BLD AUTO: 10.4 K/UL (ref 3.6–11)

## 2021-04-08 PROCEDURE — 85027 COMPLETE CBC AUTOMATED: CPT

## 2021-04-08 PROCEDURE — 80053 COMPREHEN METABOLIC PANEL: CPT

## 2021-04-08 PROCEDURE — 59025 FETAL NON-STRESS TEST: CPT

## 2021-04-08 PROCEDURE — 84156 ASSAY OF PROTEIN URINE: CPT

## 2021-04-08 PROCEDURE — 99285 EMERGENCY DEPT VISIT HI MDM: CPT

## 2021-04-08 PROCEDURE — 36415 COLL VENOUS BLD VENIPUNCTURE: CPT

## 2021-04-08 NOTE — PROGRESS NOTES
2305 - Pt arrived to unit from ED with c/o ctx that started around 0300 this morning and have lasted all day. Pt states she does not know how far apart her ctx are right now. Pt denies leakage/loss of fluid, vaginal bleeding, epigastric pain, or vision changes. Pt endorses HA, but states it is \"nothing new\" and states they \"come and go\". Pt endorses positive fetal movement. Assumed care of pt at this time. 2313 - Pt connected to EF at this time. 1805 - RN performing SVE (1/30/-3). 2337 - Dr. Hagan at pt bedside assessing pt and discussing POC. Lance Vu from Dr. Hagan to draw labs (CBC, Sample to Blood Bank, CMP, and PCR). MD would also like RN to continue serial BP readings. 56 - RN at pt bedside drawing labs at this time. Florencia Benitez MD reviewed lab results. VORB from Dr. Hagan to discharge pt home. 0 - RN at pt bedside reviewing d/c instructions with pt and family members including [place], kick counts, pregnancy precautions, and week 34 to 39 of pregnancy. Pt verbalizes understanding. 5 - RN observing pt ambulating off unit with personal belongings and family members. Pt gait steady.

## 2021-04-08 NOTE — PROGRESS NOTES
Labor Progress Note  Patient seen, fetal heart rate and contraction pattern evaluated, patient examined. Visit Vitals  /76   Pulse 86   Ht 5' 6\" (1.676 m)   Wt 104.8 kg (231 lb)   BMI 37.28 kg/m²       Physical Exam:    46 y.o.  in no acute distress. Recent Results (from the past 12 hour(s))   CBC W/O DIFF    Collection Time: 21 12:20 AM   Result Value Ref Range    WBC 10.4 3.6 - 11.0 K/uL    RBC 4.02 3.80 - 5.20 M/uL    HGB 10.9 (L) 11.5 - 16.0 g/dL    HCT 34.0 (L) 35.0 - 47.0 %    MCV 84.6 80.0 - 99.0 FL    MCH 27.1 26.0 - 34.0 PG    MCHC 32.1 30.0 - 36.5 g/dL    RDW 15.7 (H) 11.5 - 14.5 %    PLATELET 647 904 - 232 K/uL    MPV 10.4 8.9 - 12.9 FL    NRBC 0.0 0  WBC    ABSOLUTE NRBC 0.00 0.00 - 9.69 K/uL   METABOLIC PANEL, COMPREHENSIVE    Collection Time: 21 12:20 AM   Result Value Ref Range    Sodium 137 136 - 145 mmol/L    Potassium 3.7 3.5 - 5.1 mmol/L    Chloride 107 97 - 108 mmol/L    CO2 20 (L) 21 - 32 mmol/L    Anion gap 10 5 - 15 mmol/L    Glucose 131 (H) 65 - 100 mg/dL    BUN 8 6 - 20 MG/DL    Creatinine 0.42 (L) 0.55 - 1.02 MG/DL    BUN/Creatinine ratio 19 12 - 20      GFR est AA >60 >60 ml/min/1.73m2    GFR est non-AA >60 >60 ml/min/1.73m2    Calcium 8.7 8.5 - 10.1 MG/DL    Bilirubin, total 0.3 0.2 - 1.0 MG/DL    ALT (SGPT) 13 12 - 78 U/L    AST (SGOT) 8 (L) 15 - 37 U/L    Alk. phosphatase 149 (H) 45 - 117 U/L    Protein, total 6.4 6.4 - 8.2 g/dL    Albumin 2.7 (L) 3.5 - 5.0 g/dL    Globulin 3.7 2.0 - 4.0 g/dL    A-G Ratio 0.7 (L) 1.1 - 2.2     PROTEIN/CREATININE RATIO, URINE    Collection Time: 21 12:20 AM   Result Value Ref Range    Protein, urine random 37 (H) 0.0 - 11.9 mg/dL    Creatinine, urine 175.00 mg/dL    Protein/Creat.  urine Ratio 0.2       Assessment/Plan:  Patient Active Problem List   Diagnosis Code    Mild intermittent asthma without complication B75.62    Pre-existing essential hypertension complicating pregnancy in third trimester O10.013  False labor before 37 completed weeks of gestation during pregnancy in third trimester, antepartum O55.5    Maternal age > 28, multigravida, third trimester O09.523    Respiratory system disease complicating pregnancy in third trimester O99.513    36 weeks gestation of pregnancy Z3A.36     False labor before 37 completed weeks of gestation during pregnancy in third trimester, antepartum  Discharge home  Labor warnings  Follow up  As scheduled    Opal Ybarra MD  4/8/2021

## 2021-04-08 NOTE — DISCHARGE INSTRUCTIONS
Weeks 34 to 36 of Your Pregnancy: Care Instructions  Overview     By now, your baby and your belly have grown quite large. It's almost time to give birth! Your baby's lungs are almost ready to breathe air. The skull bones are firm enough to protect your baby's head, but soft enough to move down through the birth canal.  You may be feeling excited and happy at times--but also anxious or scared. You might wonder how you'll know if you're in labor or what to expect during labor. Try to be open and flexible in your expectations of the birth. Because each birth is different, there's no way to know exactly what childbirth will be like for you. Talk to your doctor or midwife about any concerns you have. If you haven't already had the Tdap shot during this pregnancy, talk to your doctor about getting it. It will help protect your  against pertussis infection. In the 36th week, most women have a test for group B streptococcus (GBS). GBS is a common bacteria that can live in the vagina and rectum. It can make your baby sick after birth. If you test positive, you will get antibiotics during labor. The medicine will help keep your baby from getting the bacteria. Follow-up care is a key part of your treatment and safety. Be sure to make and go to all appointments, and call your doctor if you are having problems. It's also a good idea to know your test results and keep a list of the medicines you take. How can you care for yourself at home? Learn about pain relief choices  · Pain is different for every woman. Talk with your doctor about your feelings about pain. · You can choose from several types of pain relief. These include medicine or breathing techniques, as well as comfort measures. You can use more than one option. · If you choose to have pain medicine during labor, talk to your doctor about your options. Some medicines lower anxiety and help with some of the pain.  Others make your lower body numb so that you won't feel pain. · Be sure to tell your doctor about your pain medicine choice before you start labor or very early in your labor. You may be able to change your mind as labor progresses. · Rarely, a woman is put to sleep by medicine given through a mask or an IV. Labor and delivery  · The first stage of labor has three parts: early, active, and transition. ? Most women have early labor at home. You can stay busy or rest, eat light snacks, drink clear fluids, and start counting contractions. ? When talking during a contraction gets hard, you may be moving to active labor. During active labor, you should head for the hospital if you are not there already. ? You are in active labor when contractions come every 3 to 4 minutes and last about 60 seconds. Your cervix is opening more rapidly. ? If your water breaks, contractions will come faster and stronger. ? During transition, your cervix is stretching, and contractions are coming more rapidly. ? You may want to push, but your cervix might not be ready. Your doctor will tell you when to push. · The second stage starts when your cervix is completely opened and you are ready to push. ? Contractions are very strong to push the baby down the birth canal.  ? You will feel the urge to push. You may feel like you need to have a bowel movement. ? You may be coached to push with contractions. These contractions will be very strong, but you will not have them as often. You can get a little rest between contractions. ? You may be emotional and irritable. You may not be aware of what is going on around you.  ? One last push, and your baby is born. · The third stage is when a few more contractions push out the placenta. This may take 30 minutes or less. · The fourth stage is the welcome recovery. You may feel overwhelmed with emotions and exhausted but alert. This is a good time to start breastfeeding. Where can you learn more?   Go to http://www.gray.com/  Enter J361 in the search box to learn more about \"Weeks 34 to 36 of Your Pregnancy: Care Instructions. \"  Current as of: 2020               Content Version: 12.8   [employer]. Care instructions adapted under license by [employer] (which disclaims liability or warranty for this information). If you have questions about a medical condition or this instruction, always ask your healthcare professional. [address] any warranty or liability for your use of this information. Pregnancy Precautions: Care Instructions  Your Care Instructions     There is no sure way to prevent labor before your due date ( labor) or to prevent most other pregnancy problems. But there are things you can do to increase your chances of a healthy pregnancy. Go to your appointments, follow your doctor's advice, and take good care of yourself. Eat well, and exercise (if your doctor agrees). And make sure to drink plenty of water. Follow-up care is a key part of your treatment and safety. Be sure to make and go to all appointments, and call your doctor if you are having problems. It's also a good idea to know your test results and keep a list of the medicines you take. How can you care for yourself at home? · Make sure you go to your prenatal appointments. At each visit, your doctor will check your blood pressure. Your doctor will also check to see if you have protein in your urine. High blood pressure and protein in urine are signs of preeclampsia. This condition can be dangerous for you and your baby. · Drink plenty of fluids. Dehydration can cause contractions. If you have kidney, heart, or liver disease and have to limit fluids, talk with your doctor before you increase the amount of fluids you drink.   · Tell your doctor right away if you notice any symptoms of an infection, such as:  ? Burning when you urinate. ? A foul-smelling discharge from your vagina. ? Vaginal itching. ? Unexplained fever. ? Unusual pain or soreness in your uterus or lower belly. · Eat a balanced diet. Include plenty of foods that are high in calcium and iron. ? Foods high in calcium include milk, cheese, yogurt, almonds, and broccoli. ? Foods high in iron include red meat, shellfish, poultry, eggs, beans, raisins, whole-grain bread, and leafy green vegetables. · Do not smoke. If you need help quitting, talk to your doctor about stop-smoking programs and medicines. These can increase your chances of quitting for good. · Do not drink alcohol or use marijuana or illegal drugs. · Follow your doctor's directions about activity. Your doctor will let you know how much, if any, exercise you can do. · Ask your doctor if you can have sex. If you are at risk for early labor, your doctor may ask you to not have sex. · Take care to prevent falls. During pregnancy, your joints are loose, and your balance is off. Sports such as bicycling, skiing, or in-line skating can increase your risk of falling. And don't ride horses or motorcycles, dive, water ski, scuba dive, or parachute jump while you are pregnant. · Avoid getting very hot. Do not use saunas or hot tubs. Avoid staying out in the sun in hot weather for long periods. Take acetaminophen (Tylenol) to lower a high fever. · Do not take any over-the-counter or herbal medicines or supplements without talking to your doctor or pharmacist first.  When should you call for help? Call 911 anytime you think you may need emergency care. For example, call if:    · You passed out (lost consciousness).     · You have a seizure.     · You have severe vaginal bleeding.     · You have severe pain in your belly or pelvis.     · You have had fluid gushing or leaking from your vagina and you know or think the umbilical cord is bulging into your vagina.  If this happens, immediately get down on your knees so your rear end (buttocks) is higher than your head. This will decrease the pressure on the cord until help arrives. Call your doctor now or seek immediate medical care if:    · You have signs of preeclampsia, such as:  ? Sudden swelling of your face, hands, or feet. ? New vision problems (such as dimness, blurring, or seeing spots). ? A severe headache.     · You have any vaginal bleeding.     · You have belly pain or cramping.     · You have a fever.     · You have had regular contractions (with or without pain) for an hour. This means that you have 8 or more within 1 hour or 4 or more in 20 minutes after you change your position and drink fluids.     · You have a sudden release of fluid from your vagina.     · You have low back pain or pelvic pressure that does not go away.     · You notice that your baby has stopped moving or is moving much less than normal.   Watch closely for changes in your health, and be sure to contact your doctor if you have any problems. Where can you learn more? Go to http://www.hightower.com/  Enter Y951 in the search box to learn more about \"Pregnancy Precautions: Care Instructions. \"  Current as of: October 8, 2020               Content Version: 12.8  © 2006-2021 [employer]. Care instructions adapted under license by [employer] (which disclaims liability or warranty for this information). If you have questions about a medical condition or this instruction, always ask your healthcare professional. [place] [address] any warranty or liability for your use of this information. [facility] Contractions: Care Instructions  Your Care Instructions     Silvestre Jimenez contractions prepare your uterus for labor. Think of them as a \"warm-up\" exercise that your body does. You may begin to feel them between the 28th and 30th weeks of your pregnancy. But they start as early as the 20th week.   [facility] contractions usually occur more often during the ninth month. They may go away when you are active and return when you rest. These contractions are like mild contractions of true labor, but they occur less often. (You feel fewer than 8 in an hour.) They don't cause your cervix to open. It may be hard for you to tell the difference between [facility] contractions and true labor, especially in your first pregnancy. Follow-up care is a key part of your treatment and safety. Be sure to make and go to all appointments, and call your doctor if you are having problems. It's also a good idea to know your test results and keep a list of the medicines you take. How can you care for yourself at home? · Try a warm bath to help relieve muscle tension and reduce pain. · Change positions every 30 minutes. Take breaks if you must sit for a long time. Get up and walk around. · Drink plenty of water. · Taking short walks may help you feel better. Your doctor needs to check any contractions that are getting stronger or closer together. Where can you learn more? Go to http://www.gray.com/  Enter Z402 in the search box to learn more about \"[place] [facility] Contractions: Care Instructions. \"  Current as of: October 8, 2020               Content Version: 12.8  © 2006-2021 [employer]. Care instructions adapted under license by [employer] (which disclaims liability or warranty for this information). If you have questions about a medical condition or this instruction, always ask your healthcare professional. [place] [address] any warranty or liability for your use of this information. Counting Your Baby's Kicks: Care Instructions  Your Care Instructions     Counting your baby's kicks is one way your doctor can tell that your baby is healthy. Most women--especially in a first pregnancy--feel their baby move for the first time between 16 and 22 weeks.  The movement may feel like flutters rather than kicks. Your baby may move more at certain times of the day. When you are active, you may notice less kicking than when you are resting. At your prenatal visits, your doctor will ask whether the baby is active. In your last trimester, your doctor may ask you to count the number of times you feel your baby move. Follow-up care is a key part of your treatment and safety. Be sure to make and go to all appointments, and call your doctor if you are having problems. It's also a good idea to know your test results and keep a list of the medicines you take. How do you count fetal kicks? · A common method of checking your baby's movement is to count the number of kicks or moves you feel in 1 hour. Ten movements (such as kicks, flutters, or rolls) in 1 hour are normal. Some doctors suggest that you count in the morning until you get to 10 movements. Then you can quit for that day and start again the next day. · Pick your baby's most active time of day to count. This may be any time from morning to evening. · If you do not feel 10 movements in an hour, your baby may be sleeping. Wait for the next hour and count again. When should you call for help? Call your doctor now or seek immediate medical care if:    · You noticed that your baby has stopped moving or is moving much less than normal.   Watch closely for changes in your health, and be sure to contact your doctor if you have any problems. Where can you learn more? Go to http://www.gray.com/  Enter [id number] in the search box to learn more about \"Counting Your Baby's Kicks: Care Instructions. \"  Current as of: October 8, 2020               Content Version: 12.8  © 7728-1206 [employer]. Care instructions adapted under license by [employer] (which disclaims liability or warranty for this information).  If you have questions about a medical condition or this instruction, always ask your healthcare professional. [place] [address] any warranty or liability for your use of this information.

## 2021-04-08 NOTE — H&P
History & Physical    Name: Vandana Dover MRN: [id number]  SSN: [id number]    YOB: 1980  Age: 46 y.o. Sex: female        Subjective:   Chief Complaint: Contractions  Estimated Date of Delivery: 21  OB History    Para Term  AB Living   5 4 4     4   SAB TAB Ectopic Molar Multiple Live Births           0 4      # Outcome Date GA Lbr Jj/2nd Weight Sex Delivery Anes PTL Lv   5 Current            4 Term 17 40w2d  3.14 kg F Vag-Spont EPIDURAL AN  CAMILLE   3 Term     M Vag-Spont EPI N CAMILLE   2 Term     M Vag-Spont EPI N CAMILLE   1 Term     F Vag-Spont EPI N CAMILLE       Vandana Dover, 46 y.o.,  ,  presents at 36w5d, complaining of Contractions. She complains of contractions. They started at 0300, 21. They were strong and regular for an hour. They became irregular in strngth and frequency, but have persisted, so she came of revaluation. Prenatal course was complicated by chronic hypertension and asthma. Please see prenatal records for details. Allergies   Allergen Reactions    Lactose Diarrhea       Prior to Admission medications    Medication Sig Start Date End Date Taking? Authorizing Provider   acetaminophen (TylenoL) 325 mg tablet Take 500 mg by mouth every four (4) hours as needed for Pain. Indications: pain   Yes Provider, Historical   albuterol (PROVENTIL HFA, VENTOLIN HFA, PROAIR HFA) 90 mcg/actuation inhaler Take 1 Puff by inhalation every six (6) hours as needed for Wheezing. 21   Vasquez Maldonado MD       Past Medical History:   Diagnosis Date    Asthma      Pt has not needed an inhaler for a year.  Essential hypertension     Genital herpes     denies current outbreak    Psychiatric problem     depression    Severe obesity ([facility] [address] 3/4/2020    Vaginal bleeding 11/15/2020       History reviewed. No pertinent surgical history.     Social History     Occupational History    Not on file   Tobacco Use    Smoking status: Never Smoker    Smokeless tobacco: Never Used   Substance and Sexual Activity    Alcohol use: No    Drug use: No    Sexual activity: Yes     Partners: Male     Birth control/protection: None       Family History   Problem Relation Age of Onset    Asthma Mother     Asthma Brother        Review of Systems: Constitutional: negative for fevers, chills, sweats and malaise  Respiratory: negative for cough, sputum, hemoptysis, pleurisy/chest pain, wheezing or dyspnea on exertion  Cardiovascular: negative for chest pressure/discomfort, palpitations, irregular heart beats, near-syncope, syncope  Gastrointestinal: negative for nausea, vomiting, melena, diarrhea and abdominal pain  Genitourinary:negative  Integument/breast: negative  Hematologic/lymphatic: negative  Musculoskeletal:negative  Neurological: positive for intermittent headaches, negative for seizures, speech problems, paresthesia, gait problems, tremor and weakness    Objective:     Physical Exam:    Visit Vitals  BP (!) 144/80   Pulse (!) 101   Ht 5' 6\" (1.676 m)   Wt 104.8 kg (231 lb)   BMI 37.28 kg/m²       General:   46 y.o.  female who appears to be in no acute distress. HEENT:   Normocephalic. Pupils are equal, round, and reactive to light. Extraocular movements are intact. Pharynx is clear. Neck:   Normal range of motion. No thyromegaly. Heart:   Regular rate and rhythm. No murmurs present. Lungs:   Clear, no rales, rhonchi, or wheezes. Abdomen:   Soft, gravid, not tender, normal bowel sounds.   No CVA tenderness   Leopold's: vertex   EFW 6.5 pounds   Uterine Activity:    Frequency: Occasional    Duration: 60 seconds    Intensity: Mild  Fetal Heart Rate:     Baseline: 130 bpm    Variability: Moderate    Accelerations: Present (15 x 15 bpm)    Decelerations: None  Category: 1  Pelvic:    Membranes: Intact   Cervical Exam:     Position Posterior    Condition Medium  Presentation Vertex  Dilation 1 cms    Effacement 50 %    Station -2    Exam by RN   Pelvis is adequate for vaginal delivery. Extremites:   Trace bilateral pedal edema. Full range of motion. Neuro:    Alert. Oriented. CN 2 - 12 intact. Motor and sensory exam grossly normal.      Prenatal Labs   Lab Results   Component Value Date/Time    Rubella, External Immune 11/04/2020    GrBStrep, External Negative 04/13/2017    HBsAg, External Negative 11/04/2020    HIV, External Negative 11/04/2020    RPR, External Non- Reactive 11/04/2020    Gonorrhea, External negative 09/21/2016    Chlamydia, External negative 09/21/2016    ABO,Rh O positive 10/05/2016     No results found for this or any previous visit (from the past 12 hour(s)). Assessment/Plan:     Patient Active Problem List   Diagnosis Code    Mild intermittent asthma without complication V96.56    Pre-existing essential hypertension complicating pregnancy in third trimester O10.013    False labor before 37 completed weeks of gestation during pregnancy in third trimester, antepartum O55.5    Maternal age > 28, multigravida, third trimester O09.523    Respiratory system disease complicating pregnancy in third trimester O99.513    36 weeks gestation of pregnancy Z3A.36       Plan:    Pre-existing essential hypertension complicating pregnancy in third trimester  Evaluate for preeclampsia, check labs and serial BP's    Maile Bush MD  4/8/2021     .

## 2021-04-17 ENCOUNTER — TRANSCRIBE ORDER (OUTPATIENT)
Dept: EMERGENCY DEPT | Age: 41
End: 2021-04-17

## 2021-04-17 ENCOUNTER — HOSPITAL ENCOUNTER (OUTPATIENT)
Dept: LAB | Age: 41
Discharge: HOME | End: 2021-04-17
Payer: MEDICAID

## 2021-04-17 DIAGNOSIS — Z01.812 PRE-PROCEDURAL LABORATORY EXAMINATIONS: ICD-10-CM

## 2021-04-17 DIAGNOSIS — Z01.812 PRE-PROCEDURAL LABORATORY EXAMINATIONS: Primary | ICD-10-CM

## 2021-04-17 PROCEDURE — U0003 INFECTIOUS AGENT DETECTION BY NUCLEIC ACID (DNA OR RNA); SEVERE ACUTE RESPIRATORY SYNDROME CORONAVIRUS 2 (SARS-COV-2) (CORONAVIRUS DISEASE [COVID-19]), AMPLIFIED PROBE TECHNIQUE, MAKING USE OF HIGH THROUGHPUT TECHNOLOGIES AS DESCRIBED BY CMS-2020-01-R: HCPCS

## 2021-04-18 LAB — SARS-COV-2, COV2NT: NOT DETECTED

## 2021-04-19 ENCOUNTER — HOSPITAL ENCOUNTER (EMERGENCY)
Age: 41
Discharge: HOME | End: 2021-04-19
Attending: OBSTETRICS & GYNECOLOGY | Admitting: OBSTETRICS & GYNECOLOGY
Payer: MEDICAID

## 2021-04-19 VITALS
BODY MASS INDEX: 36.1 KG/M2 | HEART RATE: 80 BPM | SYSTOLIC BLOOD PRESSURE: 123 MMHG | TEMPERATURE: 98.2 F | RESPIRATION RATE: 19 BRPM | DIASTOLIC BLOOD PRESSURE: 70 MMHG | HEIGHT: 67 IN | WEIGHT: 230 LBS

## 2021-04-19 PROCEDURE — 99283 EMERGENCY DEPT VISIT LOW MDM: CPT

## 2021-04-19 PROCEDURE — 75810000275 HC EMERGENCY DEPT VISIT NO LEVEL OF CARE

## 2021-04-19 PROCEDURE — 59025 FETAL NON-STRESS TEST: CPT

## 2021-04-19 NOTE — DISCHARGE INSTRUCTIONS
Patient Education   Patient Education        Early Stage of Labor at Home: Care Instructions  Overview     If you came to the hospital while in early labor, your doctor may ask you to labor at home until your contractions are stronger. Many women stay at home during early labor. This is often the longest part of the birthing process. It may last up to 2 to 3 days. Contractions are mild to moderate and shorter (about 30 to 45 seconds). You can usually keep talking during them. Contractions may also be irregular, about 5 to 20 minutes apart. They may even stop for a while. It helps to stay as relaxed as you can during this time. You can spend some or all of your early labor at home or anywhere else you may be comfortable. If you live far from the hospital or birthing center, you may want to think about going somewhere nearby so you can get back to the hospital quickly. For some women, there may be benefits to staying home during early labor, such as avoiding medicines or procedures. As labor progresses, you'll shift from early labor to active labor. During this time, contractions get more intense. They occur more often, about every 2 to 3 minutes. They also last longer, about 50 to 70 seconds. You will feel them even when you change positions and walk or move around. It may be hard to tell if you are in active labor. If you aren't sure, call your doctor or midwife. As your labor progresses, check in with your doctor or midwife about when to come back to the hospital or birthing center. You may have special instructions if your water broke or you tested positive for group B strep. Follow-up care is a key part of your treatment and safety. Be sure to make and go to all appointments, and call your doctor if you are having problems. It's also a good idea to know your test results and keep a list of the medicines you take. How can you care for yourself at home? · Get support.  Having a support person with you from early labor until after childbirth can have a positive effect on childbirth. · Find distractions. During early labor, you can walk, play cards, watch TV, or listen to music to help take your mind off your contractions. · Ask your partner, labor , or  for a massage. Shoulder and low back massage during contractions may ease your pain. Strong massage of the back muscles (counterpressure) during contractions may help relieve the pain of back labor. Tell your labor  exactly where to push and how hard to push. · Use imagery. This means using your imagination to decrease your pain. For instance, to help manage pain, picture your contractions as waves rolling over you. Picture a peaceful place, such as a beach or mountain stream, to help you relax between contractions. · Change positions during labor. Walking, kneeling, or sitting on a big rubber ball (birth ball) are good options. · Use focused breathing techniques. Breathing in a rhythm can distract you from pain. · Take a warm shower or bath. Warm water may ease pain and stress. When should you call for help? Call  911 anytime you think you may need emergency care. For example, call if:    · You passed out (lost consciousness).     · You have a seizure.     · You have severe vaginal bleeding.     · You have severe pain in your belly or pelvis that doesn't get better between contractions.     · You have had fluid gushing or leaking from your vagina and you know or think the umbilical cord is bulging into your vagina. If this happens, immediately get down on your knees so your rear end (buttocks) is higher than your head. This will decrease the pressure on the cord until help arrives. Call your doctor now or seek immediate medical care if:    · You have new or worse signs of preeclampsia, such as:  ? Sudden swelling of your face, hands, or feet. ? New vision problems (such as dimness, blurring, or seeing spots).   ? A severe headache.     · You have any vaginal bleeding.     · You have belly pain or cramping.     · You have a fever.     · You have had regular contractions (with or without pain) for an hour. This means that you have 8 or more within 1 hour or 4 or more in 20 minutes after you change your position and drink fluids.     · You have a sudden release of fluid from your vagina.     · You have low back pain or pelvic pressure that does not go away.     · You notice that your baby has stopped moving or is moving much less than normal.   Watch closely for changes in your health, and be sure to contact your doctor if you have any problems. Where can you learn more? Go to http://www.gray.com/  Enter [id number] in the search box to learn more about \"Early Stage of Labor at Home: Care Instructions. \"  Current as of: October 8, 2020               Content Version: 12.8  © 7970-2224 [employer]. Care instructions adapted under license by [employer] (which disclaims liability or warranty for this information). If you have questions about a medical condition or this instruction, always ask your healthcare professional. [address] any warranty or liability for your use of this information. Counting Your Baby's Kicks: Care Instructions  Your Care Instructions     Counting your baby's kicks is one way your doctor can tell that your baby is healthy. Most women--especially in a first pregnancy--feel their baby move for the first time between 16 and 22 weeks. The movement may feel like flutters rather than kicks. Your baby may move more at certain times of the day. When you are active, you may notice less kicking than when you are resting. At your prenatal visits, your doctor will ask whether the baby is active. In your last trimester, your doctor may ask you to count the number of times you feel your baby move. Follow-up care is a key part of your treatment and safety.  Be sure to make and go to all appointments, and call your doctor if you are having problems. It's also a good idea to know your test results and keep a list of the medicines you take. How do you count fetal kicks? · A common method of checking your baby's movement is to count the number of kicks or moves you feel in 1 hour. Ten movements (such as kicks, flutters, or rolls) in 1 hour are normal. Some doctors suggest that you count in the morning until you get to 10 movements. Then you can quit for that day and start again the next day. · Pick your baby's most active time of day to count. This may be any time from morning to evening. · If you do not feel 10 movements in an hour, your baby may be sleeping. Wait for the next hour and count again. When should you call for help? Call your doctor now or seek immediate medical care if:    · You noticed that your baby has stopped moving or is moving much less than normal.   Watch closely for changes in your health, and be sure to contact your doctor if you have any problems. Where can you learn more? Go to http://www.gray.com/  Enter [id number] in the search box to learn more about \"Counting Your Baby's Kicks: Care Instructions. \"  Current as of: October 8, 2020               Content Version: 12.8  © 2006-2021 [employer]. Care instructions adapted under license by [employer] (which disclaims liability or warranty for this information). If you have questions about a medical condition or this instruction, always ask your healthcare professional. [place] [address] any warranty or liability for your use of this information.

## 2021-04-19 NOTE — H&P
OB History & Physical    Name: Althea Sanchez MRN: [id number]  SSN: [id number]    YOB: 1980  Age: 46 y.o. Sex: female      Subjective:     Chief Complaint: Contractions and decreased fetal movement    History of Present Illness: Althea Sanchez is a 46 y.o.  female  with an estimated gestational age of 36w4d with Estimated Date of Delivery: 21. Patient complains of contractions and decreased fetal movement. She denies bleeding or leaking of fluid. She tried drinking juice to improve fetal movement and this did not help. She is \"over it\" and would like to be induced. OB History        5    Para   4    Term   4            AB        Living   4       SAB        TAB        Ectopic        Molar        Multiple   0    Live Births   4              Past Medical History:   Diagnosis Date    Asthma      Pt has not needed an inhaler for a year.  Essential hypertension     Genital herpes     denies current outbreak    Psychiatric problem     depression    Severe obesity ([address] 3/4/2020    Vaginal bleeding 11/15/2020   Chronic hypertension- on medications prior to pregnancy, has not needed medications during pregnancy    No past surgical history on file. Social History     Occupational History    Not on file   Tobacco Use    Smoking status: Never Smoker    Smokeless tobacco: Never Used   Substance and Sexual Activity    Alcohol use: No    Drug use: No    Sexual activity: Yes     Partners: Male     Birth control/protection: None     Family History   Problem Relation Age of Onset    Asthma Mother     Asthma Brother        Allergies   Allergen Reactions    Lactose Diarrhea     Prior to Admission medications    Medication Sig Start Date End Date Taking? Authorizing Provider   acetaminophen (TylenoL) 325 mg tablet Take 500 mg by mouth every four (4) hours as needed for Pain.  Indications: pain   Yes Provider, Historical   albuterol (PROVENTIL HFA, VENTOLIN HFA, PROAIR HFA) 90 mcg/actuation inhaler Take 1 Puff by inhalation every six (6) hours as needed for Wheezing. 21   Herman Nielsen MD      OB History : 4 prior vaginal births, all induced  GYN History- normal menses outside of pregnancy, History of HSV    Review of Systems    Objective:     Vitals:    Vitals:    21 0048 21 0050   BP:  123/70   Pulse:  80   Resp:  19   Temp:  98.2 °F (36.8 °C)   Weight: 104.3 kg (230 lb)    Height: 5' 6.5\" (1.689 m)       Temp (24hrs), Av.2 °F (36.8 °C), Min:98.2 °F (36.8 °C), Max:98.2 °F (36.8 °C)    BP  Min: 123/70  Max: 123/70     Physical Exam  General: in NAD  HEENT: normocephalic  Abdomen: Gravid, soft, nontender  Skin: warm, dry, no abnormal lesions noted  Pelvic:Cervical Exam: 1cm per nursing  Uterine Activity: rare contraction  noted  Membranes: no gross evidence of ROM  Fetal Heart Rate: 150's moderate variability and reactivity; no decelerations        Patient Active Problem List   Diagnosis Code    Mild intermittent asthma without complication P60.75    Pre-existing essential hypertension complicating pregnancy in third trimester O10.013    False labor before 40 completed weeks of gestation during pregnancy in third trimester, antepartum O55.5    Maternal age > 28, multigravida, third trimester O09.523    Respiratory system disease complicating pregnancy in third trimester O99.513    36 weeks gestation of pregnancy Z3A.36     Assessment and Plan:      @ 38w2d with concerns of decreased fetal movement and contractions    1. IUP- category 1 and reactive    2. Decreased fetal movement- reassuring fetal tracing, MARIANA-17 and vertex. 3. Contractions- cervix examined by nurse, unchanged from last visit here and at office. Very few contractions noted. Discussed false labor. 4. Chronic hypertension- no medications, normotensive today    5.  Asthma- she states she rarely uses an inhaler      Patient discharged with precautions    Signed By: Suly Officer, MD     April 19, 2021

## 2021-04-19 NOTE — PROGRESS NOTES
5000  Patient arrives to L&D reporting painful contractions that have been going on for the past month. Per patient, that is no significant change in the contractions, but she would like to deliver. Patient has been oriented to room and uniot. Patient changed into en. EFM and TOCO placed. 6709  SVE  1/thick/-3  Dr Rachel Menjivar at bedside assessing the patient and the [facility]  Dr Rachel Menjivar is performing an ultrasound exam;  If strip is reactive patient will be discharged home. [id number]  Discharge teaching and instructions given. Patient verbalized understanding. 2290  Patient left L&D.

## 2021-04-29 ENCOUNTER — HOSPITAL ENCOUNTER (INPATIENT)
Age: 41
LOS: 2 days | Discharge: HOME | DRG: 560 | End: 2021-05-02
Attending: OBSTETRICS & GYNECOLOGY | Admitting: OBSTETRICS & GYNECOLOGY
Payer: MEDICAID

## 2021-04-29 DIAGNOSIS — R10.9 ABDOMINAL PAIN IN PREGNANCY, THIRD TRIMESTER: ICD-10-CM

## 2021-04-29 DIAGNOSIS — O26.893 ABDOMINAL PAIN IN PREGNANCY, THIRD TRIMESTER: ICD-10-CM

## 2021-04-29 DIAGNOSIS — O16.3 ELEVATED BLOOD PRESSURE AFFECTING PREGNANCY IN THIRD TRIMESTER, ANTEPARTUM: ICD-10-CM

## 2021-04-29 DIAGNOSIS — O10.013 PRE-EXISTING ESSENTIAL HYPERTENSION COMPLICATING PREGNANCY IN THIRD TRIMESTER: ICD-10-CM

## 2021-04-29 DIAGNOSIS — Z3A.39 39 WEEKS GESTATION OF PREGNANCY: Primary | ICD-10-CM

## 2021-04-29 PROBLEM — Z86.19 HISTORY OF HERPES GENITALIS: Chronic | Status: ACTIVE | Noted: 2021-04-29

## 2021-04-29 PROBLEM — O47.03 FALSE LABOR BEFORE 37 COMPLETED WEEKS OF GESTATION DURING PREGNANCY IN THIRD TRIMESTER, ANTEPARTUM: Status: RESOLVED | Noted: 2021-04-08 | Resolved: 2021-04-29

## 2021-04-29 PROBLEM — Z3A.36 36 WEEKS GESTATION OF PREGNANCY: Status: RESOLVED | Noted: 2021-04-08 | Resolved: 2021-04-29

## 2021-04-29 LAB
ALBUMIN SERPL-MCNC: 2.6 G/DL (ref 3.5–5)
ALBUMIN/GLOB SERPL: 0.7 {RATIO} (ref 1.1–2.2)
ALP SERPL-CCNC: 159 U/L (ref 45–117)
ALT SERPL-CCNC: 13 U/L (ref 12–78)
ANION GAP SERPL CALC-SCNC: 11 MMOL/L (ref 5–15)
AST SERPL-CCNC: 11 U/L (ref 15–37)
BILIRUB SERPL-MCNC: 0.3 MG/DL (ref 0.2–1)
BUN SERPL-MCNC: 9 MG/DL (ref 6–20)
BUN/CREAT SERPL: 23 (ref 12–20)
CALCIUM SERPL-MCNC: 8.6 MG/DL (ref 8.5–10.1)
CHLORIDE SERPL-SCNC: 105 MMOL/L (ref 97–108)
CO2 SERPL-SCNC: 20 MMOL/L (ref 21–32)
CREAT SERPL-MCNC: 0.39 MG/DL (ref 0.55–1.02)
CREAT UR-MCNC: 187 MG/DL
ERYTHROCYTE [DISTWIDTH] IN BLOOD BY AUTOMATED COUNT: 16.1 % (ref 11.5–14.5)
GLOBULIN SER CALC-MCNC: 3.9 G/DL (ref 2–4)
GLUCOSE SERPL-MCNC: 87 MG/DL (ref 65–100)
HCT VFR BLD AUTO: 36.9 % (ref 35–47)
HGB BLD-MCNC: 11.9 G/DL (ref 11.5–16)
MCH RBC QN AUTO: 27.2 PG (ref 26–34)
MCHC RBC AUTO-ENTMCNC: 32.2 G/DL (ref 30–36.5)
MCV RBC AUTO: 84.2 FL (ref 80–99)
NRBC # BLD: 0 K/UL (ref 0–0.01)
NRBC BLD-RTO: 0 PER 100 WBC
PLATELET # BLD AUTO: 314 K/UL (ref 150–400)
PMV BLD AUTO: 10.6 FL (ref 8.9–12.9)
POTASSIUM SERPL-SCNC: 3.9 MMOL/L (ref 3.5–5.1)
PROT SERPL-MCNC: 6.5 G/DL (ref 6.4–8.2)
PROT UR-MCNC: 36 MG/DL (ref 0–11.9)
PROT/CREAT UR-RTO: 0.2
RBC # BLD AUTO: 4.38 M/UL (ref 3.8–5.2)
SODIUM SERPL-SCNC: 136 MMOL/L (ref 136–145)
WBC # BLD AUTO: 9.3 K/UL (ref 3.6–11)

## 2021-04-29 PROCEDURE — 36415 COLL VENOUS BLD VENIPUNCTURE: CPT

## 2021-04-29 PROCEDURE — 84156 ASSAY OF PROTEIN URINE: CPT

## 2021-04-29 PROCEDURE — 80053 COMPREHEN METABOLIC PANEL: CPT

## 2021-04-29 PROCEDURE — 75410000002 HC LABOR FEE PER 1 HR: Performed by: NURSE PRACTITIONER

## 2021-04-29 PROCEDURE — 75410000003 HC RECOV DEL/VAG/CSECN EA 0.5 HR: Performed by: NURSE PRACTITIONER

## 2021-04-29 PROCEDURE — 76060000078 HC EPIDURAL ANESTHESIA: Performed by: ANESTHESIOLOGY

## 2021-04-29 PROCEDURE — 85027 COMPLETE CBC AUTOMATED: CPT

## 2021-04-29 PROCEDURE — 77030005513 HC CATH URETH FOL11 MDII -B

## 2021-04-29 PROCEDURE — 2709999900 HC NON-CHARGEABLE SUPPLY

## 2021-04-29 PROCEDURE — 75410000000 HC DELIVERY VAGINAL/SINGLE: Performed by: NURSE PRACTITIONER

## 2021-04-29 RX ORDER — SODIUM CHLORIDE, SODIUM LACTATE, POTASSIUM CHLORIDE, CALCIUM CHLORIDE 600; 310; 30; 20 MG/100ML; MG/100ML; MG/100ML; MG/100ML
125 INJECTION, SOLUTION INTRAVENOUS CONTINUOUS
Status: DISCONTINUED | OUTPATIENT
Start: 2021-04-29 | End: 2021-04-30

## 2021-04-29 RX ORDER — SODIUM CHLORIDE 0.9 % (FLUSH) 0.9 %
5-40 SYRINGE (ML) INJECTION AS NEEDED
Status: DISCONTINUED | OUTPATIENT
Start: 2021-04-29 | End: 2021-04-30

## 2021-04-29 RX ORDER — SODIUM CHLORIDE 0.9 % (FLUSH) 0.9 %
5-40 SYRINGE (ML) INJECTION EVERY 8 HOURS
Status: DISCONTINUED | OUTPATIENT
Start: 2021-04-29 | End: 2021-04-30

## 2021-04-30 ENCOUNTER — ANESTHESIA EVENT (OUTPATIENT)
Dept: LABOR AND DELIVERY | Age: 41
DRG: 560 | End: 2021-04-30
Payer: MEDICAID

## 2021-04-30 ENCOUNTER — ANESTHESIA (OUTPATIENT)
Dept: LABOR AND DELIVERY | Age: 41
DRG: 560 | End: 2021-04-30
Payer: MEDICAID

## 2021-04-30 PROBLEM — R10.9 ABDOMINAL PAIN DURING PREGNANCY IN THIRD TRIMESTER: Status: ACTIVE | Noted: 2021-04-30

## 2021-04-30 PROBLEM — O16.3 ELEVATED BLOOD PRESSURE AFFECTING PREGNANCY IN THIRD TRIMESTER, ANTEPARTUM: Status: ACTIVE | Noted: 2021-04-30

## 2021-04-30 PROBLEM — O26.893 ABDOMINAL PAIN DURING PREGNANCY IN THIRD TRIMESTER: Status: ACTIVE | Noted: 2021-04-30

## 2021-04-30 PROCEDURE — 74011000258 HC RX REV CODE- 258: Performed by: NURSE PRACTITIONER

## 2021-04-30 PROCEDURE — 74011250637 HC RX REV CODE- 250/637: Performed by: NURSE PRACTITIONER

## 2021-04-30 PROCEDURE — 36415 COLL VENOUS BLD VENIPUNCTURE: CPT

## 2021-04-30 PROCEDURE — 74011250637 HC RX REV CODE- 250/637: Performed by: OBSTETRICS & GYNECOLOGY

## 2021-04-30 PROCEDURE — 74011000250 HC RX REV CODE- 250: Performed by: ANESTHESIOLOGY

## 2021-04-30 PROCEDURE — 75410000002 HC LABOR FEE PER 1 HR: Performed by: NURSE PRACTITIONER

## 2021-04-30 PROCEDURE — 74011250636 HC RX REV CODE- 250/636: Performed by: ANESTHESIOLOGY

## 2021-04-30 PROCEDURE — 77030014125 HC TY EPDRL BBMI -B: Performed by: ANESTHESIOLOGY

## 2021-04-30 PROCEDURE — 65270000029 HC RM PRIVATE

## 2021-04-30 PROCEDURE — 74011250636 HC RX REV CODE- 250/636: Performed by: NURSE PRACTITIONER

## 2021-04-30 RX ORDER — MISOPROSTOL 200 UG/1
800 TABLET ORAL
Status: DISCONTINUED | OUTPATIENT
Start: 2021-04-30 | End: 2021-04-30

## 2021-04-30 RX ORDER — FENTANYL CITRATE 50 UG/ML
INJECTION, SOLUTION INTRAMUSCULAR; INTRAVENOUS AS NEEDED
Status: DISCONTINUED | OUTPATIENT
Start: 2021-04-30 | End: 2021-04-30 | Stop reason: HOSPADM

## 2021-04-30 RX ORDER — IBUPROFEN 800 MG/1
800 TABLET ORAL EVERY 8 HOURS
Status: DISCONTINUED | OUTPATIENT
Start: 2021-04-30 | End: 2021-05-02 | Stop reason: HOSPADM

## 2021-04-30 RX ORDER — FENTANYL/BUPIVACAINE/NS/PF 2-1250MCG
10 PREFILLED PUMP RESERVOIR EPIDURAL CONTINUOUS
Status: DISCONTINUED | OUTPATIENT
Start: 2021-04-30 | End: 2021-04-30

## 2021-04-30 RX ORDER — OXYCODONE AND ACETAMINOPHEN 5; 325 MG/1; MG/1
2 TABLET ORAL
Status: DISCONTINUED | OUTPATIENT
Start: 2021-04-30 | End: 2021-05-02 | Stop reason: HOSPADM

## 2021-04-30 RX ORDER — OXYTOCIN/RINGER'S LACTATE 30/500 ML
87.3 PLASTIC BAG, INJECTION (ML) INTRAVENOUS AS NEEDED
Status: DISCONTINUED | OUTPATIENT
Start: 2021-04-30 | End: 2021-05-02 | Stop reason: HOSPADM

## 2021-04-30 RX ORDER — MISOPROSTOL 200 UG/1
800 TABLET ORAL
Status: ACTIVE | OUTPATIENT
Start: 2021-04-30 | End: 2021-05-01

## 2021-04-30 RX ORDER — OXYTOCIN/RINGER'S LACTATE 30/500 ML
10 PLASTIC BAG, INJECTION (ML) INTRAVENOUS AS NEEDED
Status: COMPLETED | OUTPATIENT
Start: 2021-04-30 | End: 2021-04-30

## 2021-04-30 RX ORDER — NALOXONE HYDROCHLORIDE 0.4 MG/ML
0.4 INJECTION, SOLUTION INTRAMUSCULAR; INTRAVENOUS; SUBCUTANEOUS AS NEEDED
Status: DISCONTINUED | OUTPATIENT
Start: 2021-04-30 | End: 2021-05-02 | Stop reason: HOSPADM

## 2021-04-30 RX ORDER — LANOLIN ALCOHOL/MO/W.PET/CERES
1 CREAM (GRAM) TOPICAL
Status: DISCONTINUED | OUTPATIENT
Start: 2021-04-30 | End: 2021-05-02 | Stop reason: HOSPADM

## 2021-04-30 RX ORDER — ACETAMINOPHEN 325 MG/1
650 TABLET ORAL
Status: DISCONTINUED | OUTPATIENT
Start: 2021-04-30 | End: 2021-05-02 | Stop reason: HOSPADM

## 2021-04-30 RX ORDER — NALBUPHINE HYDROCHLORIDE 10 MG/ML
10 INJECTION, SOLUTION INTRAMUSCULAR; INTRAVENOUS; SUBCUTANEOUS
Status: DISCONTINUED | OUTPATIENT
Start: 2021-04-30 | End: 2021-04-30

## 2021-04-30 RX ORDER — EPHEDRINE SULFATE/0.9% NACL/PF 50 MG/5 ML
10 SYRINGE (ML) INTRAVENOUS
Status: DISCONTINUED | OUTPATIENT
Start: 2021-04-30 | End: 2021-04-30

## 2021-04-30 RX ORDER — NALBUPHINE HYDROCHLORIDE 10 MG/ML
10 INJECTION, SOLUTION INTRAMUSCULAR; INTRAVENOUS; SUBCUTANEOUS
Status: DISCONTINUED | OUTPATIENT
Start: 2021-04-30 | End: 2021-05-02 | Stop reason: HOSPADM

## 2021-04-30 RX ORDER — ZOLPIDEM TARTRATE 5 MG/1
5 TABLET ORAL
Status: DISCONTINUED | OUTPATIENT
Start: 2021-04-30 | End: 2021-05-02 | Stop reason: HOSPADM

## 2021-04-30 RX ORDER — METHYLERGONOVINE MALEATE 0.2 MG/ML
0.2 INJECTION INTRAVENOUS EVERY 4 HOURS
Status: ACTIVE | OUTPATIENT
Start: 2021-04-30 | End: 2021-04-30

## 2021-04-30 RX ORDER — ALBUTEROL SULFATE 0.83 MG/ML
2.5 SOLUTION RESPIRATORY (INHALATION)
Status: DISCONTINUED | OUTPATIENT
Start: 2021-04-30 | End: 2021-05-02 | Stop reason: HOSPADM

## 2021-04-30 RX ORDER — OXYTOCIN/RINGER'S LACTATE 30/500 ML
87.3 PLASTIC BAG, INJECTION (ML) INTRAVENOUS AS NEEDED
Status: DISCONTINUED | OUTPATIENT
Start: 2021-04-30 | End: 2021-04-30

## 2021-04-30 RX ORDER — SODIUM CHLORIDE 0.9 % (FLUSH) 0.9 %
5-40 SYRINGE (ML) INJECTION AS NEEDED
Status: DISCONTINUED | OUTPATIENT
Start: 2021-04-30 | End: 2021-04-30

## 2021-04-30 RX ORDER — SODIUM CHLORIDE 0.9 % (FLUSH) 0.9 %
5-40 SYRINGE (ML) INJECTION EVERY 8 HOURS
Status: DISCONTINUED | OUTPATIENT
Start: 2021-04-30 | End: 2021-04-30

## 2021-04-30 RX ORDER — SWAB
1 SWAB, NON-MEDICATED MISCELLANEOUS DAILY
Status: DISCONTINUED | OUTPATIENT
Start: 2021-04-30 | End: 2021-05-02 | Stop reason: HOSPADM

## 2021-04-30 RX ORDER — LIDOCAINE HYDROCHLORIDE AND EPINEPHRINE 15; 5 MG/ML; UG/ML
INJECTION, SOLUTION EPIDURAL AS NEEDED
Status: DISCONTINUED | OUTPATIENT
Start: 2021-04-30 | End: 2021-04-30 | Stop reason: HOSPADM

## 2021-04-30 RX ORDER — SIMETHICONE 80 MG
80 TABLET,CHEWABLE ORAL
Status: DISCONTINUED | OUTPATIENT
Start: 2021-04-30 | End: 2021-05-02 | Stop reason: HOSPADM

## 2021-04-30 RX ORDER — SODIUM CHLORIDE, SODIUM LACTATE, POTASSIUM CHLORIDE, CALCIUM CHLORIDE 600; 310; 30; 20 MG/100ML; MG/100ML; MG/100ML; MG/100ML
125 INJECTION, SOLUTION INTRAVENOUS CONTINUOUS
Status: DISCONTINUED | OUTPATIENT
Start: 2021-04-30 | End: 2021-04-30

## 2021-04-30 RX ORDER — NALOXONE HYDROCHLORIDE 0.4 MG/ML
0.4 INJECTION, SOLUTION INTRAMUSCULAR; INTRAVENOUS; SUBCUTANEOUS AS NEEDED
Status: DISCONTINUED | OUTPATIENT
Start: 2021-04-30 | End: 2021-04-30

## 2021-04-30 RX ORDER — DOCUSATE SODIUM 100 MG/1
100 CAPSULE, LIQUID FILLED ORAL
Status: DISCONTINUED | OUTPATIENT
Start: 2021-04-30 | End: 2021-05-02 | Stop reason: HOSPADM

## 2021-04-30 RX ORDER — MAG HYDROX/ALUMINUM HYD/SIMETH 200-200-20
30 SUSPENSION, ORAL (FINAL DOSE FORM) ORAL
Status: DISCONTINUED | OUTPATIENT
Start: 2021-04-30 | End: 2021-04-30

## 2021-04-30 RX ORDER — OXYTOCIN/RINGER'S LACTATE 30/500 ML
0-20 PLASTIC BAG, INJECTION (ML) INTRAVENOUS
Status: DISCONTINUED | OUTPATIENT
Start: 2021-04-30 | End: 2021-04-30

## 2021-04-30 RX ORDER — OXYCODONE AND ACETAMINOPHEN 5; 325 MG/1; MG/1
1 TABLET ORAL
Status: DISCONTINUED | OUTPATIENT
Start: 2021-04-30 | End: 2021-05-02 | Stop reason: HOSPADM

## 2021-04-30 RX ORDER — HYDROCORTISONE ACETATE PRAMOXINE HCL 2.5; 1 G/100G; G/100G
CREAM TOPICAL AS NEEDED
Status: DISCONTINUED | OUTPATIENT
Start: 2021-04-30 | End: 2021-04-30 | Stop reason: CLARIF

## 2021-04-30 RX ORDER — ACETAMINOPHEN 325 MG/1
650 TABLET ORAL
Status: DISCONTINUED | OUTPATIENT
Start: 2021-04-30 | End: 2021-04-30

## 2021-04-30 RX ORDER — OXYTOCIN/RINGER'S LACTATE 30/500 ML
10 PLASTIC BAG, INJECTION (ML) INTRAVENOUS AS NEEDED
Status: DISCONTINUED | OUTPATIENT
Start: 2021-04-30 | End: 2021-04-30

## 2021-04-30 RX ORDER — LIDOCAINE HYDROCHLORIDE 10 MG/ML
10 INJECTION INFILTRATION; PERINEURAL AS NEEDED
Status: DISCONTINUED | OUTPATIENT
Start: 2021-04-30 | End: 2021-04-30

## 2021-04-30 RX ORDER — ONDANSETRON 2 MG/ML
4 INJECTION INTRAMUSCULAR; INTRAVENOUS
Status: DISCONTINUED | OUTPATIENT
Start: 2021-04-30 | End: 2021-04-30

## 2021-04-30 RX ORDER — METHYLERGONOVINE MALEATE 0.2 MG/ML
0.2 INJECTION INTRAVENOUS
Status: ACTIVE | OUTPATIENT
Start: 2021-04-30 | End: 2021-05-01

## 2021-04-30 RX ADMIN — IBUPROFEN 800 MG: 800 TABLET, FILM COATED ORAL at 18:46

## 2021-04-30 RX ADMIN — OXYTOCIN: 10 INJECTION, SOLUTION INTRAMUSCULAR; INTRAVENOUS at 03:01

## 2021-04-30 RX ADMIN — FENTANYL CITRATE 75 MCG: 0.05 INJECTION, SOLUTION INTRAMUSCULAR; INTRAVENOUS at 02:04

## 2021-04-30 RX ADMIN — NALBUPHINE HYDROCHLORIDE 10 MG: 10 INJECTION, SOLUTION INTRAMUSCULAR; INTRAVENOUS; SUBCUTANEOUS at 04:49

## 2021-04-30 RX ADMIN — FENTANYL CITRATE 25 MCG: 0.05 INJECTION, SOLUTION INTRAMUSCULAR; INTRAVENOUS at 01:51

## 2021-04-30 RX ADMIN — IBUPROFEN 800 MG: 800 TABLET, FILM COATED ORAL at 06:27

## 2021-04-30 RX ADMIN — LIDOCAINE HYDROCHLORIDE AND EPINEPHRINE 3.5 ML: 15; 5 INJECTION, SOLUTION EPIDURAL at 02:04

## 2021-04-30 RX ADMIN — ONDANSETRON 4 MG: 2 INJECTION INTRAMUSCULAR; INTRAVENOUS at 01:09

## 2021-04-30 RX ADMIN — Medication 10 ML/HR: at 02:36

## 2021-04-30 RX ADMIN — AMPICILLIN SODIUM 2 G: 2 INJECTION, POWDER, FOR SOLUTION INTRAMUSCULAR; INTRAVENOUS at 02:33

## 2021-04-30 RX ADMIN — OXYCODONE HYDROCHLORIDE AND ACETAMINOPHEN 1 TABLET: 5; 325 TABLET ORAL at 20:11

## 2021-04-30 RX ADMIN — SODIUM CHLORIDE, POTASSIUM CHLORIDE, SODIUM LACTATE AND CALCIUM CHLORIDE 125 ML/HR: 600; 310; 30; 20 INJECTION, SOLUTION INTRAVENOUS at 02:27

## 2021-04-30 NOTE — LACTATION NOTE
This note was copied from a baby's chart. Mother states she has been doing both breast and formula. Mothers 5th child. Mother denies any question or needs. Discussed with mother her plan for feeding. Reviewed the benefits of exclusive breast milk feeding during the hospital stay. Informed her of the risks of using formula to supplement in the first few days of life as well as the benefits of successful breast milk feeding; referred her to the Breastfeeding booklet about this information. She acknowledges understanding of information reviewed and states that it is her plan to breastfeed and formula feed her infant. Will support her choice and offer additional information as needed. Pt chooses to do both breast and bottle feeding, will follow recommendations to breastfeed first to help establish milk supply and only top off with formula, if needed, will be educated on potential consequences of early supplementation on breastfeeding success, but will be supported in decision to do both.         Breast Assessment  Left Breast: Large  Left Nipple: Everted, Intact  Right Breast: Large  Right Nipple: Everted, Intact  Breast- Feeding Assessment  Attends Breast-Feeding Classes: No  Breast-Feeding Experience: Yes  Breast Trauma/Surgery: No  Type/Quality: Good  Lactation Consultant Visits  Breast-Feedings: Good (per mother)  Mother/Infant Observation  Mother Observation: Breast comfortable

## 2021-04-30 NOTE — PROGRESS NOTES
2040: Pt arrived to unit, oriented to room and unit. EFM applied to patient. 2106: SVE per CNM 3/60/-3 Pt advised to walk the halls    2218: Pt taken off EFM to walk the halls at this time. 0022: Pt placed back on EFM at this time. 0126: SVE per this RN for patient feeling the need to push 4/60/-2    0141: time out for epidural at this time. 0155: SORM of large clear fluids with no odor. 0222: SVE per CNM 7/90/0    0246: SVE per CNM c/c/+1    0255: Delivery of a viable infant male infant placed on mothers chest, dried and stimulated. 0300: Delivery of placenta at this time. Lacerations being assessed by CNM. No lacerations noted. 0725: Bedside and Verbal shift change report given to Delilah LONDONO RN (oncoming nurse) by YAKOV Boucher RN (offgoing nurse). Report included the following information SBAR, Kardex, Procedure Summary, Intake/Output, MAR and Recent Results.

## 2021-04-30 NOTE — PROGRESS NOTES
[facility] Labor Progress Note     Patient: Davon Gifford MRN: [id number]  SSN: [id number]    YOB: 1980  Age: 46 y.o. Sex: female        Subjective:   Patient having a hard time coping well with contractions. She is requesting an epidural       Objective:   Blood pressure (!) 146/90, pulse 94, height 5' 6.5\" (1.689 m), weight 104.8 kg (231 lb), unknown if currently breastfeeding. Fetal heart baseline 150 , moderate variability, positive accelerations, negative decelerations, Uterine contractions q 4-5 minutes,  resting tone soft, Sterile Vaginal Exam by RN:  4 cm dilated/ 60 % effaced/ -3 station, cervix mid fetal presentation vertex, membranes intact     Patient Vitals for the past 12 hrs:   Pulse BP   21 2139 94 (!) 146/90   21 2116 97 (!) 143/88     Patient Vitals for the past 4 hrs: Mode Fetal Heart Rate Variability Decelerations Accelerations RN Reviewed Strip?   21 2200 External 145 6-25 BPM None Yes Yes   21 2130 External 145 6-25 BPM None Yes Yes         Assessment:     39w6d  Category 1 fetal heart rate tracing   Labor   Chronic hypertension in pregnancy with elevated blood pressures      Plan:   Continue current orders/management- awaiting epidural  Pitocin augmentation if needed- discussed R/B/SE with patient and she agrees. BP stable- no meds needed at this time.    Anticipate     Oscar Rosa CNM

## 2021-04-30 NOTE — ANESTHESIA PROCEDURE NOTES
CSE Block    Start time: 4/30/2021 1:44 AM  End time: 4/30/2021 2:09 AM  Performed by: Tahir Beltrán DO  Authorized by: Tahir Beltrán DO     Pre-Procedure  preanesthetic checklist: patient identified, risks and benefits discussed, anesthesia consent, site marked, patient being monitored and timeout performed    Timeout Time: 01:41        Procedure:   Patient Position:  Seated  Prep Region:  Lumbar  Prep: Betadine    Location:  L3-4    Epidural Needle:   Needle Type:  [place]  Needle Gauge:  18 G  Injection Technique:  Loss of resistance using air  Attempts:  1    Spinal Needle:   Needle Gauge:  27 G    Catheter:   Catheter Type:  Standard  Catheter Size:  20 G  Catheter at Skin Depth (cm):  10  Depth in Epidural Space (cm):  2.5  Events: no cerebrospinal fluid with aspiration and no paresthesia    Test Dose:  Negative    Assessment:   Catheter Secured:  Tegaderm and tape  Patient tolerance:  Patient tolerated the procedure well with no immediate complications  First attempt with epidural needle YAMILETH: good result. Raven advanced and fentanyl injected. However, catheter inserted and yielded + heme. [place] reintroduced at slightly different angle. No CSF nor blood from second catheter attempt.

## 2021-04-30 NOTE — L&D DELIVERY NOTE
CNM Delivery Note       Patient: Lamonte Bonilla MRN: [id number]  SSN: [id number]    YOB: 1980  Age: 46 y.o. Sex: female          of a live viable  male at 80 with Apgars 9,9 in MAIRA position under epidural anesthesia with mother in lithotomy position. Shoulders spontaneous, easily delivered with maternal effort. Vigorous infant placed on maternal abdomen immediately following delivery. Cord was clamped and cut by the fob after pulsation ceased. Placenta and membranes spontaneous, intact, with 3 vessel cord via Mara Faustino mechanism. Fundus massaged to firm. Estimated blood loss 200 mL. Vagina and perineum inspected. Perineum intact. Mother and infant stable, bonding, establishing breastfeeding. Delivery Summary    Patient: Lamonte Bonilla MRN: [id number]  SSN: [id number]    YOB: 1980  Age: 46 y.o.   Sex: female       Information for the patient's :  Terri Husain, Male Rae Trevon [[id number]]       Labor Events:    Labor:      Steroids: None   Cervical Ripening Date/Time:       Cervical Ripening Type: None   Antibiotics During Labor: Yes   Rupture Identifier: Sac 1    Rupture Date/Time: 2021 1:55 AM   Rupture Type: SROM   Amniotic Fluid Volume: Large    Amniotic Fluid Description: Clear    Amniotic Fluid Odor: None    Induction:         Induction Date/Time:        Indications for Induction:      Augmentation: None   Augmentation Date/Time:      Indications for Augmentation:     Labor complications: None       Additional complications:        Delivery Events:  Indications For Episiotomy:     Episiotomy: None   Perineal Laceration(s): None   Repaired:     Periurethral Laceration Location:      Repaired:     Labial Laceration Location:     Repaired:     Sulcal Laceration Location:     Repaired:     Vaginal Laceration Location:     Repaired:     Cervical Laceration Location:     Repaired:     Repair Suture: None   Number of Repair Packets:     Estimated Blood Loss (ml):  ml Quantitative Blood Loss (ml)                Delivery Date: 2021    Delivery Time: 2:55 AM  Delivery Type: Vaginal, Spontaneous  Sex:  Male    Gestational Age: 37w11d   Delivery Clinician:  Allyssa Pillai  Living Status: Living   Delivery Location: L&D 208          APGARS  One minute Five minutes Ten minutes   Skin color: 1   1        Heart rate: 2   2        Grimace: 2   2        Muscle tone: 2   2        Breathin   2        Totals: 9   9            Presentation: Vertex    Position:        Resuscitation Method:  Suctioning-bulb; Tactile Stimulation     Meconium Stained: None      Cord Information: 3 Vessels  Complications: None  Cord around:    Delayed cord clamping? Yes  Cord clamped date/time:2021  2:58 AM  Disposition of Cord Blood: Lab    Blood Gases Sent?: No    Placenta:  Date/Time: 2021  3:00 AM  Removal: Spontaneous      Appearance: Normal     Kyle Measurements:  Birth Weight: 3.22 kg      Birth Length: 52.1 cm      Head Circumference: 35 cm      Chest Circumference: 35 cm     Abdominal Girth: 33 cm    Other Providers:   CARMELA CALDWELL;TRUDY DASILVA;KEVIN CLANCY;SHRUTHI BOYD;SILVIA ACE, Midwife;Primary Nurse;Primary Kyle Nurse;Staff Nurse;Staff Nurse           Group B Strep:   Lab Results   Component Value Date/Time    GrBStrep, External Negative 2017     Information for the patient's :  Kimberly Godfrey [[id number]]     Lab Results   Component Value Date/Time    ABO/Rh(D) B POSITIVE 2021 04:45 AM    JANET IgG NEG 2021 04:45 AM    Bilirubin if JANET pos: IF DIRECT AZUCENA POSITIVE, BILIRUBIN TO FOLLOW 2021 04:45 AM        No results for input(s): PCO2CB, PO2CB, HCO3I, SO2I, IBD, PTEMPI, SPECTI, PHICB, ISITE, IDEV, IALLEN in the last 72 hours.

## 2021-04-30 NOTE — ANESTHESIA PREPROCEDURE EVALUATION
Relevant Problems   RESPIRATORY SYSTEM   (+) History of herpes genitalis   (+) Mild intermittent asthma without complication      CARDIOVASCULAR   (+) Pre-existing essential hypertension complicating pregnancy in third trimester       Anesthetic History   No history of anesthetic complications            Review of Systems / Medical History  Patient summary reviewed and nursing notes reviewed    Pulmonary                   Neuro/Psych              Cardiovascular                    Comments: Chronic hypertension   GI/Hepatic/Renal                Endo/Other        Obesity     Other Findings              Physical Exam    Airway             Cardiovascular    Rhythm: regular           Dental         Pulmonary  Breath sounds clear to auscultation               Abdominal         Other Findings            Anesthetic Plan    ASA: 3  Anesthesia type: spinal            Anesthetic plan and risks discussed with: Patient      Informed consent obtained.

## 2021-04-30 NOTE — PROGRESS NOTES
4/30/2021  1:35 PM    CM met with EMELY to complete initial assessment and begin discharge planning. MOB verified and confirmed demographics. EMELY lives with Reinier Quintanilla ([phone]), along with her children ages: 16, 15, 5, and 1 yr old at the address on file ([address] [place] [address]). EMELY noted this is their second baby together. EMELY is employed through [employer], however she has not been working due to pandemic. EMELY plans to return to work. FOB is aemployed and will be taking about 3 wks off. EMELY reports she has good family support. EMELY plans to breast and bottle feed baby and is interested in ordering pump through insurance. EMELY plans to follow with [facility], for pediatric follow up. EMELY has car seat, bassinet/crib, clothing, bottles and all necessary supplies for baby. EMELY has [employer],  and will be adding baby to this policy. CM discussed process to add baby to insurance, MOB verbalized understanding. EMELY is also enrolled in [facility] and SNAP benefits and understands how to add baby to program.     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 EMELY sees  [facility] at [facility]. MOB follows up weekly. CM assisted EMELY in ordering breast pump through insurance. CM provided MOB with confirmation page along with phone number for [address] Care Management Interventions  PCP Verified by CM: Yes([facility])  Mode of Transport at Discharge:  Other (see comment)  Transition of Care Consult (CM Consult): Discharge Planning  Current Support Network: Own Home, Family Lives Nearby, Lives with Spouse  Confirm Follow Up Transport: Family  Discharge Location  Discharge Placement: Home with family assistance  Kortney June

## 2021-04-30 NOTE — H&P
CESIA History & Physical    Subjective:     Alexandrea Langston is a 46 y.o. female  SIUP @ 39w5d by Estimated Date of Delivery: 21 who arrives to L&D with chief complaint of contractions. Her pregnancy course is complicated by cHTN-not on meds, genital herpes, asthma, depression and obesity. She states that she has had contractions on and off and they have worsened since yesterday. She states that she is \"not leaving here without a baby\". She was seen in the office today and was 3/50/-3. She endorses +FM, intact membranes; Pt denies vaginal bleeding, fever/chills, chest pain, SOB, HA, scotomata, sudden swelling, nor malaise. Pregnancy problems include:   - Body mass index is 36.73 kg/m². -   Patient Active Problem List   Diagnosis Code    Mild intermittent asthma without complication Z03.11    Pre-existing essential hypertension complicating pregnancy in third trimester O10.013    Maternal age > 28, multigravida, third trimester O09.523    Respiratory system disease complicating pregnancy in third trimester O99.513    39 weeks gestation of pregnancy Z3A.39    Abdominal pain in pregnancy, third trimester O26.893, R10.9    History of herpes genitalis Z86.19       Allergies  -   Allergies   Allergen Reactions    Lactose Diarrhea       Prenatal Labs  Lab Results   Component Value Date/Time    Rubella, External Immune 2020    GrBStrep, External Negative 2017    HBsAg, External Negative 2020    HIV, External Negative 2020    RPR, External Non- Reactive 2020    Gonorrhea, External negative 2016    Chlamydia, External negative 2016     O positive    OB History  OB History        5    Para   4    Term   4            AB        Living   4       SAB        TAB        Ectopic        Molar        Multiple   0    Live Births   4                 PMHx  Past Medical History:   Diagnosis Date    Asthma      Pt has not needed an inhaler for a year.     Essential hypertension     Genital herpes     denies current outbreak    Psychiatric problem     depression    Severe obesity ([facility] [address] 3/4/2020    Vaginal bleeding 11/15/2020        PSHx  History reviewed. No pertinent surgical history.      F/SHx  Family History   Problem Relation Age of Onset    Asthma Mother     Asthma Brother       Social History     Socioeconomic History    Marital status:      Spouse name: Not on file    Number of children: 3    Years of education: Not on file    Highest education level: Not on file   Occupational History    Not on file   Social Needs    Financial resource strain: Not on file    Food insecurity     Worry: Not on file     Inability: Not on file   [employer] needs     Medical: Not on file     Non-medical: Not on file   Tobacco Use    Smoking status: Never Smoker    Smokeless tobacco: Never Used   Substance and Sexual Activity    Alcohol use: No    Drug use: No    Sexual activity: Yes     Partners: Male     Birth control/protection: None   Lifestyle    Physical activity     Days per week: Not on file     Minutes per session: Not on file    Stress: Not on file   Relationships    Social connections     Talks on phone: Not on file     Gets together: Not on file     Attends Islam service: Not on file     Active member of club or organization: Not on file     Attends meetings of clubs or organizations: Not on file     Relationship status: Not on file    Intimate partner violence     Fear of current or ex partner: Not on file     Emotionally abused: Not on file     Physically abused: Not on file     Forced sexual activity: Not on file   Other Topics Concern     Service Not Asked    Blood Transfusions Not Asked    Caffeine Concern Not Asked    Occupational Exposure Not Asked   Donnita Munda Hazards Not Asked    Sleep Concern Not Asked    Stress Concern Not Asked    Weight Concern Not Asked    Special Diet Not Asked    Back Care Not Asked    Exercise Not Asked    Bike Helmet Not Asked    [facility] Not Asked    Self-Exams Not Asked   Social History Narrative    Not on file       Home Medications:  Prior to Admission Medications   Prescriptions Last Dose Informant Patient Reported? Taking?   acetaminophen (TylenoL) 325 mg tablet   Yes No   Sig: Take 500 mg by mouth every four (4) hours as needed for Pain. Indications: pain   albuterol (PROVENTIL HFA, VENTOLIN HFA, PROAIR HFA) 90 mcg/actuation inhaler   No No   Sig: Take 1 Puff by inhalation every six (6) hours as needed for Wheezing. Facility-Administered Medications: None        Review of Systems:  A comprehensive review of systems was negative except for that written in the History of Present Illness. Objective:     Vitals:    21   BP: (!) 143/88    Pulse: 97    Weight:  104.8 kg (231 lb)   Height:  5' 6.5\" (1.689 m)      Body mass index is 36.73 kg/m². Physical Exam:  General:  Alert, cooperative, no distress, appears stated age. Lungs:   Clear to auscultation bilaterally. Symmetrical expansion, non-labored   Heart:  Regular rate and rhythm   GI/Abdomen:   Soft, non-tender. Gravid. Extremities: Extremities normal, atraumatic, no cyanosis or edema. Skin: Skin color, texture, turgor normal. No rashes or lesions   /Cervical Exam: no lesions or erythema  Cervix: Cervical Exam  Dilation (cm): 3  Eff: 60 %  Station: -3  Vaginal exam done by? : Scott Cole CNM     Reassessment at 0100 by RN: /-3     Fetal Presentation: Vertex by Leopolds  Pelvimetry: Adequate  Fetal Size: AGA      Electronic Fetal Monitoring    Fetal Heart Rate:     Baseline: 145, moderate variability, +accels, negative decels, cat 1 FHT.    Uterine Contractions: External, irregular q 5-8 minutes,relaxed to palpation    Assessment:   46 y.o. female  SIUP @  39w5 d by MEGAN of Estimated Date of Delivery: 21  Admission diagnoses:   -chronic hypertension in pregnancy   -abdominal pain in the third trimester of pregnancy     Pregnancy problems include:   -   Patient Active Problem List   Diagnosis Code    Mild intermittent asthma without complication K57.16    Pre-existing essential hypertension complicating pregnancy in third trimester O10.013    Maternal age > 28, multigravida, third trimester O09.523    Respiratory system disease complicating pregnancy in third trimester O99.513    39 weeks gestation of pregnancy Z3A.39    Abdominal pain in pregnancy, third trimester O26.893, R10.9    History of herpes genitalis Z86.19     - Body mass index is 36.73 kg/m². Pertinent Labs:  - O positive  -   Lab Results   Component Value Date/Time    Rubella, External Immune 11/04/2020    GrBStrep, External Negative 04/13/2017    HBsAg, External Negative 11/04/2020    HIV, External Negative 11/04/2020    RPR, External Non- Reactive 11/04/2020    Gonorrhea, External negative 09/21/2016    Chlamydia, External negative 09/21/2016       EFM:   - Category 1  - NST: Fetal NST Findings: reactive with indication of establishing a baseline fetal evaluation conducted over a minimum of 20 minutes    Plan:   Admit to labor and delivery for labor     Tests/Labs/Monitoring ordered: Electronic Fetal Monitoring Continuous EFM, SVE, CBC w/o DIF, Sample to blood bank (hold), Protein/Creatinine ratio (urine) and COMPMET  GBS positive- amp  Pt with painful contractions and cervical change- will admit for labor. Review of prenatal records to include labs/diagnostic testing, encounter notes  Review and sign consents   Patient Education:   - Pain management, as indicated; R/B/SE of IV meds, and epidural discussed. - Plan of care        Patient has been counseled regarding this plan of care and is in agreement; all questions answered.     Collaborative MD: Marivel Sahu      Signed By:  Mendoza Hines CNM      4/29/2021 9:28 PM

## 2021-05-01 PROCEDURE — 2709999900 HC NON-CHARGEABLE SUPPLY

## 2021-05-01 PROCEDURE — 65270000029 HC RM PRIVATE

## 2021-05-01 PROCEDURE — 74011250637 HC RX REV CODE- 250/637: Performed by: OBSTETRICS & GYNECOLOGY

## 2021-05-01 PROCEDURE — 74011250637 HC RX REV CODE- 250/637: Performed by: NURSE PRACTITIONER

## 2021-05-01 RX ADMIN — IBUPROFEN 800 MG: 800 TABLET, FILM COATED ORAL at 10:51

## 2021-05-01 RX ADMIN — Medication 1 TABLET: at 10:50

## 2021-05-01 RX ADMIN — OXYCODONE HYDROCHLORIDE AND ACETAMINOPHEN 1 TABLET: 5; 325 TABLET ORAL at 01:36

## 2021-05-01 RX ADMIN — IBUPROFEN 800 MG: 800 TABLET, FILM COATED ORAL at 18:56

## 2021-05-01 RX ADMIN — FERROUS SULFATE TAB 325 MG (65 MG ELEMENTAL FE) 325 MG: 325 (65 FE) TAB at 10:50

## 2021-05-01 NOTE — ROUTINE PROCESS
Bedside and Verbal shift change report given to Mary Abbott RN (oncoming nurse) by Royce Dumont RN  (offgoing nurse). Report included the following information SBAR, Kardex, Intake/Output, MAR and Recent Results.

## 2021-05-01 NOTE — ROUTINE PROCESS
Bedside and Verbal shift change report given to Aviva Crum (oncoming nurse) by Freida Lentz. Misty Gandhi (offgoing nurse). Report given with SBAR, Kardex, Intake/Output and MAR.

## 2021-05-01 NOTE — PROGRESS NOTES
After receiving report from [place], patient states \"this is my fifth baby. I'm not going to do what you tell me to. I'm not going to feed him every 3-4 hours, or only 10 ml. I don't want lactation to come in here and tell me what I already know\". Explained to patient to just let me know when she does feed baby. Pt acknowledges. 0130- When entering pt's room, pt sleep in bed with baby on pillow sleeping in the bed. RN asked mom about diaper changes and feedings while pt kept drifting back to sleep. Questioned if pt wanted this RN to place baby back in the crib. Pt looked at baby and said \"no he is fine\" and laid back down. Explained to pt she had to stay awake and could not sleep with baby. Pt stated okay and stayed laying down. 9339- Pt sleeping with baby on chest. RN woke pt and asked if she could place baby in crib. Pt agrees and rolls over and goes back to sleep. RN wraps baby in two blankets and places in crib. Crib locked in place at mom's bedside.

## 2021-05-01 NOTE — LACTATION NOTE
This note was copied from a baby's chart. Pumping:  Guidelines for pumping, milk collection and storage, proper cleaning of pump parts all reviewed. How to establish and maintain breast milk supply through pumping reviewed. Differences between hospital grade rental pumps vs store bought double electric/hand pumps discussed. Set up pumping with double electric set up. Medela Symphony pump to bedside per patient request.  Mom states she has been formula feeding baby in hospital, but may offer breast when she goes home. Lactogenesis discussed and manual breast expression or pumping encouraged to protect mother's supply. Mom ordered breast pump through [facility] En[facility] pizarro yesterday. Mom showed how to use double electric pump. Mother does not wish for assistance with pump session. Engorment precautions given. Pt will successfully establish breastfeeding by feeding in response to early feeding cues   or wake every 3h, will obtain deep latch, and will keep log of feedings/output. Taught to BF at hunger cues and or q 2-3 hrs and to offer 10-20 drops of hand expressed colostrum at any non-feeds.       Breast Assessment  Left Breast: Large  Left Nipple: Everted, Intact  Right Breast: Large  Right Nipple: Everted, Intact  Breast- Feeding Assessment  Attends Breast-Feeding Classes: No  Breast-Feeding Experience: Yes  Breast Trauma/Surgery: No  Type/Quality: (mom says she's only been feeding formula)  Lactation Consultant Visits  Breast-Feedings: (did not see at breast)

## 2021-05-01 NOTE — PROGRESS NOTES
Post-Partum Day Number 1 Progress Note    [address] for the patient's :  Ge Berry, Male Flora Mode [[id number]]   Vaginal, Spontaneous    Patient doing well without significant complaint. Voiding without difficulty, normal lochia. Vitals:  Visit Vitals  BP (!) 133/99 (BP 1 Location: Right upper arm, BP Patient Position: At rest)   Pulse 91   Temp 97.6 °F (36.4 °C)   Resp 18   Ht 5' 6.5\" (1.689 m)   Wt 104.8 kg (231 lb)   SpO2 93%   Breastfeeding Unknown   BMI 36.73 kg/m²     Temp (24hrs), Av.2 °F (36.8 °C), Min:97.6 °F (36.4 °C), Max:98.5 °F (36.9 °C)        Exam:   Patient without distress.                 FF @ U-2 NT                LE NT w/o edema    Labs:     Lab Results   Component Value Date/Time    WBC 9.3 2021 10:05 PM    WBC 10.4 2021 12:20 AM    WBC 10.4 2021 01:01 AM    WBC 12.1 (H) 2020 05:30 PM    WBC 7.8 10/29/2019 12:14 PM    WBC 7.5 2019 11:48 AM    WBC 8.9 2017 06:23 AM    WBC 10.8 10/23/2016 02:17 PM    HGB 11.9 2021 10:05 PM    HGB 10.9 (L) 2021 12:20 AM    HGB 11.0 (L) 2021 01:01 AM    HGB 12.3 2020 05:30 PM    HGB 13.5 10/29/2019 12:14 PM    HGB 13.3 2019 11:48 AM    HGB 11.2 (L) 2017 06:23 AM    HGB 12.8 10/23/2016 02:17 PM    HCT 36.9 2021 10:05 PM    HCT 34.0 (L) 2021 12:20 AM    HCT 34.0 (L) 2021 01:01 AM    HCT 36.8 2020 05:30 PM    HCT 40.5 10/29/2019 12:14 PM    HCT 41.2 2019 11:48 AM    HCT 34.3 (L) 2017 06:23 AM    HCT 36.7 10/23/2016 02:17 PM    PLATELET 529  10:05 PM    PLATELET 082  12:20 AM    PLATELET 718  01:01 AM    PLATELET 117  05:30 PM    PLATELET 960  12:14 PM    PLATELET 212  11:48 AM    PLATELET 853  06:23 AM    PLATELET 236  02:17 PM    Hgb, External 12.8 2020    Hgb, External 12.8 10/05/2016    Hct, External 37.3 2020    Hct, External 38.7 10/05/2016 Platelet cnt., External 310 2020    Platelet cnt., External 297 10/05/2016       No results found for this or any previous visit (from the past 24 hour(s)). Assessment: PPD 1 s/p     Plan:  1. VMI / Rh pos / Rubella immune / Circ performed this am  2. Continue routine postpartum care  3.  BP stable  4.   Discharge home tomorrow    Nighat Faye DO, [facility]

## 2021-05-02 VITALS
BODY MASS INDEX: 36.26 KG/M2 | OXYGEN SATURATION: 97 % | DIASTOLIC BLOOD PRESSURE: 87 MMHG | HEIGHT: 67 IN | WEIGHT: 231 LBS | RESPIRATION RATE: 18 BRPM | SYSTOLIC BLOOD PRESSURE: 129 MMHG | TEMPERATURE: 97.8 F | HEART RATE: 87 BPM

## 2021-05-02 PROCEDURE — 74011250637 HC RX REV CODE- 250/637: Performed by: NURSE PRACTITIONER

## 2021-05-02 RX ADMIN — FERROUS SULFATE TAB 325 MG (65 MG ELEMENTAL FE) 325 MG: 325 (65 FE) TAB at 10:53

## 2021-05-02 RX ADMIN — Medication 1 TABLET: at 10:53

## 2021-05-02 RX ADMIN — IBUPROFEN 800 MG: 800 TABLET, FILM COATED ORAL at 03:17

## 2021-05-02 NOTE — PROGRESS NOTES
Patient discharged to home with infant. Infant in carseat. Patient in wheelchair. No prescriptions. Discharge instructions reviewed with patient, signed by patient, and copies given. Per patient, no questions. Patient and infant bands verified. See infant note and/or footprint sheet on chart. Birth registrar, Hyacinth Deshpande, states that infant's mom states she is  but refuses give birth registrar 's name. Also last name given to the birth registrar by patient is different than the name noted in patient's chart. Per birth registrar, patient states Mildred Palomares is her maiden name.

## 2021-05-02 NOTE — DISCHARGE SUMMARY
Obstetrical Discharge Summary     Name: Vandana Dover MRN: [id number]  SSN: [id number]    YOB: 1980  Age: 46 y.o. Sex: female      Allergies: Lactose    Admit Date: 2021    Discharge Date: 2021     Admitting Physician: Jorge Barksdale MD     Attending Physician:  Sho Campos MD     * Admission Diagnoses: Abdominal pain during pregnancy in third trimester [O26.893, R10.9]  Elevated blood pressure affecting pregnancy in third trimester, antepartum [O16.3]    * Discharge Diagnoses:   Information for the patient's :  Micaela Hopepr, Male [facility] [[id number]]   Delivery of a 3.22 kg male infant via Vaginal, Spontaneous on 2021 at 2:55 AM  by Lei Lares. Apgars were 9  and 9 . Intact     Additional Diagnoses:   Hospital Problems as of 2021 Date Reviewed: 2021          Codes Class Noted - Resolved POA    Elevated blood pressure affecting pregnancy in third trimester, antepartum ICD-10-CM: O16.3  ICD-9-CM: 642.33  2021 - Present Unknown        Abdominal pain during pregnancy in third trimester ICD-10-CM: O26.893, R10.9  ICD-9-CM: 646.83, 789.00  2021 - Present Unknown        39 weeks gestation of pregnancy ICD-10-CM: Z3A.39  ICD-9-CM: V22.2  2021 - Present Yes        Abdominal pain in pregnancy, third trimester ICD-10-CM: O26.893, R10.9  ICD-9-CM: 646.83, 789.00  2021 - Present Yes             Lab Results   Component Value Date/Time    Rubella, External Immune 2020    GrBStrep, External Negative 2017    ABO,Rh O positive 10/05/2016    There is no immunization history for the selected administration types on file for this patient. * Procedures:           * Discharge Condition: [facility] Hospital Course: Normal hospital course following the delivery. * Disposition: Home    Discharge Medications:   Current Discharge Medication List          * Follow-up Care/Patient Instructions:   Activity: Activity as tolerated  Diet: Regular Diet  Wound Care: None needed    Follow-up Information    None

## 2021-05-02 NOTE — PROGRESS NOTES
PostPartum Note    Samantha Munroe  [id number]  1980  46 y.o.    S:  Ms. Samantha Munroe is a 46 y.o.  PPD #2 s/p  @ 39w6d. Doing well. She had a baby boy. Her lochia is like a period. She describes her pain as mild and is well controlled with PO medications. She is breast and bottle feeding. Has given nursing a hard time about timing of feeding. Has a pump. She is ambulating and voiding. Tolerating PO intake. O:   Visit Vitals  /87 (BP 1 Location: Left upper arm, BP Patient Position: At rest)   Pulse 87   Temp 97.8 °F (36.6 °C)   Resp 18   Ht 5' 6.5\" (1.689 m)   Wt 104.8 kg (231 lb)   SpO2 97%   Breastfeeding Unknown   BMI 36.73 kg/m²       Lab Results   Component Value Date/Time    WBC 9.3 2021 10:05 PM    HGB 11.9 2021 10:05 PM    HCT 36.9 2021 10:05 PM    PLATELET 946  10:05 PM    MCV 84.2 2021 10:05 PM    Hgb, External 12.8 2020    Hct, External 37.3 2020    Platelet cnt., External 310 2020       Gen - No acute distress  Abdomen - Fundus firm, below the umbilicus   Ext - Warm, well perfused. Nontender    A/P:  PPD #2 s/p  @ 39w6d doing well. 1.  Routine PP instructions/ care discussed  2. Blood type - Rh +  3. Rubella imm  4. Circumcision done    5. Discharge today   6. F/U 4-6 weeks for PP check.       Milo Dorsey MD  [facility]

## 2021-05-02 NOTE — ROUTINE PROCESS
Bedside and Verbal shift change report given to Caroline Okeefe RN (oncoming nurse) by Rojas Keen RN (offgoing nurse). Report included the following information SBAR, Kardex, Intake/Output, MAR and Accordion.

## 2021-05-02 NOTE — LACTATION NOTE
This note was copied from a baby's chart. Mother sitting in chair holding baby. Mother states she has been BF baby and pumping some. Mother denies questions or needs. Chart shows numerous feedings, void, stool WNL. Discussed importance of monitoring outputs and feedings on first week of life. Discussed ways to tell if baby is  getting enough breast milk, ie  voids and stools, change in color of stool, and return to birth wt within 2 weeks. Follow up with pediatrician visit for weight check in 1-2 days (per AAP guidelines.)  Encouraged to call [facility] Line  [phone]  for any questions/problems that arise. Mother also given breastfeeding support group dates and times for any future needs.       Breast Assessment  Left Breast: Large  Left Nipple: Everted, Intact  Right Breast: Large  Right Nipple: Everted, Intact  Breast- Feeding Assessment  Attends Breast-Feeding Classes: No  Breast-Feeding Experience: Yes  Breast Trauma/Surgery: No  Type/Quality: (mom says she's only been feeding formula)  Lactation Consultant Visits  Breast-Feedings: Good (per mother)  Mother/Infant Observation  Mother Observation: Breast comfortable

## 2021-05-02 NOTE — DISCHARGE INSTRUCTIONS
Discharge Instructions for Vaginal Delivery    Patient ID:  Gloria Mitchell  [id number]  8 y.o.  1980    Take Home Medications       Continue taking your prenatal vitamins if you are breastfeeding. Follow-up care is a key part of your treatment and safety. Please schedule and keep appointments. Follow-up with your primary OB in 1 - 2 weeks and in 6 weeks. Activity  Avoid anything in your vagina for 6 weeks (no intercourse, tampons, or douching). You may drive unless you are taking prescription pain medications. Climbing stairs and light lifting are okay. Please avoid excessive exercise, though walking is okay- you'll be tired! Diet  Regular diet as tolerated. Be sure to drink plenty of fluids if you are breastfeeding. Wound care  If you have stitches, continue to rinse with a squirt bottle of warm water each time you void for about 7-10 days. .  Your stitches will gradually dissolve over four to eight weeks. Sitz baths are also helpful to keep the wound clean, encourage healing, and to help with pain associated with the stitches or hemorrhoids. You can use either a sitz bath basin or a bathtub filled with 2-3\" inches of plain warm water. Soak for 10 minutes 3 times a day as tolerated. Pain Management  1. Over the counter medications such as Tylenol and ibuprofen (Motrin or Advil) are ideal.  These may be taken together, alternating doses. You may  take the maximum dose:  Motrin or Advil (generic ibuprofen), either 3 tablets every 6 hours or 4 tablets every 8 hours or Tylenol (acetominophen) 1000mg every 6 hours (equivalent to 2 extra strength Tylenol). 2. You may also have a precrescription for stronger pain medication. Take only as needed and transition to over the counter medication in the next few days. Minimize amounts of the prescription medication, as it can be habit-forming and will worsen or cause constipation.  Most patients will find that within a couple of days, their pain is adequately controlled using only over-the-counter medications. 3. The prescription pain medication is mixed with Tylenol, therefore, you should not take any extra Tylenol or acetaminophen until you have reduced your prescription pain medication. 4. Add heating pad or sitz baths as needed. Add hemorrhoid wipes or ointments if needed    Constipation  1. Constipation is normal after pregnancy and delivery, especially while taking prescription narcotic pain medication. 2. Over the counter remedies including ducosate (Colace), take 1-2 capsules 1-2 times daily for soft stool as needed. You may also add/ try milk of magnesia or rectal remedies such as Dulcolax or Fleets enema. Recovery: What to Expect at Home  1. Fatigue is expected. Try to rest when you can and don't worry about doing housework or other tasks which can wait. 2. The soreness along your bottom will improve significantly over the first 2 weeks, but it may take 6 weeks before you are completely recovered. 3. Back pain or general body aches or muscle soreness are expected and should improve with acetominophen or ibuprofen. 4. Leg swelling due to pregnancy and/or IV fluids given in the hospital will take about two weeks to resolve. 5. Most women experience some form of the \"Baby Blues\" after having a baby. Feeling emotional, tearful, frustrated, anxious, sad, and irritable some of the time is normal and go away after about 2 weeks. Adequate rest and help from your family will help. Take breaks from caring for the baby. Call your doctor if your symptoms seem severe, last more than 2 weeks, or seem to be getting worse instead of better. Get help immediately if you have thoughts of wanting to hurt yourself or others! Call your doctor or seek immediate medical care if you have:  Heavy vaginal bleeding, soaking through one or more pads an hour for several hours. Foul-smelling discharge from your vagina or incision.   Consistent nausea and vomiting and cannot keep fluids down. Consistent pain that does not get better after you take pain medicine. Sudden chest pain and shortness of breath  Signs of a blood clot: pain/ swelling/ increasing redness in your lower extremeties  Signs of infection: increased pain in your abdomen or vaginal area; red streaks, warmth, or tenderness of your breasts; fever of 100.5 F or greater      Patient Education        After Your Delivery (the Postpartum Period): Care Instructions  Your Care Instructions     Congratulations on the birth of your baby. Like pregnancy, the  period can be a time of excitement, akiko, and exhaustion. You may look at your wondrous little baby and feel happy. You may also be overwhelmed by your new sleep hours and new responsibilities. At first, babies often sleep during the days and are awake at night. They do not have a pattern or routine. They may make sudden gasps, jerk themselves awake, or look like they have crossed eyes. These are all normal, and they may even make you smile. In these first weeks after delivery, try to take good care of yourself. It may take 4 to 6 weeks to feel like yourself again, and possibly longer if you had a  birth. You will likely feel very tired for several weeks. Your days will be full of ups and downs, but lots of akiko as well. Follow-up care is a key part of your treatment and safety. Be sure to make and go to all appointments, and call your doctor if you are having problems. It's also a good idea to know your test results and keep a list of the medicines you take. How can you care for yourself at home? Take care of your body after delivery  · Use pads instead of tampons for the bloody flow that may last as long as 2 weeks. · Ease cramps with ibuprofen (Advil, Motrin). · Ease soreness of hemorrhoids and the area between your vagina and rectum with ice compresses or witch hazel pads.   · Ease constipation by drinking lots of fluid and eating high-fiber foods. Ask your doctor about over-the-counter stool softeners. · Cleanse yourself with a gentle squeeze of warm water from a bottle instead of wiping with toilet paper. · Take a sitz bath in warm water several times a day. · Wear a good nursing bra. Ease sore and swollen breasts with warm, wet washcloths. · If you are not breastfeeding, use ice rather than heat for breast soreness. · Your period may not start for several months if you are breastfeeding. You may bleed more, and longer at first, than you did before you got pregnant. · Wait until you are healed (about 4 to 6 weeks) before you have sexual intercourse. Your doctor will tell you when it is okay to have sex. · Try not to travel with your baby for 5 or 6 weeks. If you take a long car trip, make frequent stops to walk around and stretch. Avoid exhaustion  · Rest every day. Try to nap when your baby naps. · Ask another adult to be with you for a few days after delivery. · Plan for  if you have other children. · Stay flexible so you can eat at odd hours and sleep when you need to. Both you and your baby are making new schedules. · Plan small trips to get out of the house. Change can make you feel less tired. · Ask for help with housework, cooking, and shopping. Remind yourself that your job is to care for your baby. Know about help for postpartum depression  · \"Baby blues\" are common for the first 1 to 2 weeks after birth. You may cry or feel sad or irritable for no reason. · Rest whenever you can. Being tired makes it harder to handle your emotions. · Go for walks with your baby. · Talk to your partner, friends, and family about your feelings. · If your symptoms last for more than a few weeks, or if you feel very depressed, ask your doctor for help. · Postpartum depression can be treated. Support groups and counseling can help. Sometimes medicine can also help.   Stay healthy  · Eat healthy foods so you have more energy and lose extra baby pounds. · If you breastfeed, avoid drugs. If you quit smoking during pregnancy, try to stay smoke-free. If you choose to have a drink now and then, have only one drink, and limit the number of occasions that you have a drink. Wait to breastfeed at least 2 hours after you have a drink to reduce the amount of alcohol the baby may get in the milk. · Start daily exercise after 4 to 6 weeks, but rest when you feel tired. · Learn exercises to tone your belly. Do Kegel exercises to regain strength in your pelvic muscles. You can do these exercises while you stand or sit. ? Squeeze the same muscles you would use to stop your urine. Your belly and thighs should not move. ? Hold the squeeze for 3 seconds, and then relax for 3 seconds. ? Start with 3 seconds. Then add 1 second each week until you are able to squeeze for 10 seconds. ? Repeat the exercise 10 to 15 times for each session. Do three or more sessions each day. · Find a class for new mothers and new babies that has an exercise time. · If you had a  birth, give yourself a bit more time before you exercise, and be careful. When should you call for help? Call  911 anytime you think you may need emergency care. For example, call if:    · You have thoughts of harming yourself, your baby, or another person.     · You passed out (lost consciousness).     · You have chest pain, are short of breath, or cough up blood.     · You have a seizure. Call your doctor now or seek immediate medical care if:    · You have severe vaginal bleeding. This means you are passing blood clots and soaking through a pad each hour for 2 or more hours.     · You are dizzy or lightheaded, or you feel like you may faint.     · You have a fever.     · You have new or more belly pain.     · You have signs of a blood clot in your leg (called a deep vein thrombosis), such as:  ? Pain in the calf, back of the knee, thigh, or groin. ?  Redness and swelling in your leg or groin.     · You have signs of preeclampsia, such as:  ? Sudden swelling of your face, hands, or feet. ? New vision problems (such as dimness, blurring, or seeing spots). ? A severe headache. Watch closely for changes in your health, and be sure to contact your doctor if:    · Your vaginal bleeding seems to be getting heavier.     · You have new or worse vaginal discharge.     · You feel sad, anxious, or hopeless for more than a few days.     · You do not get better as expected. Where can you learn more? Go to http://www.hightower.com/  Enter A461 in the search box to learn more about \"After Your Delivery (the Postpartum Period): Care Instructions. \"  Current as of: October 8, 2020               Content Version: 12.8  © 2006-2021 Healthwise, [employer]. Care instructions adapted under license by [employer] (which disclaims liability or warranty for this information). If you have questions about a medical condition or this instruction, always ask your healthcare professional. [place] [address] any warranty or liability for your use of this information.

## 2021-07-20 ENCOUNTER — OFFICE VISIT (OUTPATIENT)
Dept: PRIMARY CARE CLINIC | Age: 41
End: 2021-07-20
Payer: MEDICAID

## 2021-07-20 VITALS
HEART RATE: 77 BPM | OXYGEN SATURATION: 98 % | WEIGHT: 221 LBS | BODY MASS INDEX: 34.69 KG/M2 | TEMPERATURE: 97.5 F | HEIGHT: 67 IN | SYSTOLIC BLOOD PRESSURE: 136 MMHG | DIASTOLIC BLOOD PRESSURE: 84 MMHG | RESPIRATION RATE: 15 BRPM

## 2021-07-20 DIAGNOSIS — M79.605 PAIN IN BOTH LOWER EXTREMITIES: ICD-10-CM

## 2021-07-20 DIAGNOSIS — E66.9 OBESITY (BMI 30-39.9): ICD-10-CM

## 2021-07-20 DIAGNOSIS — R53.83 OTHER FATIGUE: Primary | ICD-10-CM

## 2021-07-20 DIAGNOSIS — E55.9 VITAMIN D DEFICIENCY: ICD-10-CM

## 2021-07-20 DIAGNOSIS — Z11.59 NEED FOR HEPATITIS C SCREENING TEST: ICD-10-CM

## 2021-07-20 DIAGNOSIS — M79.604 PAIN IN BOTH LOWER EXTREMITIES: ICD-10-CM

## 2021-07-20 PROBLEM — Z3A.39 39 WEEKS GESTATION OF PREGNANCY: Status: RESOLVED | Noted: 2021-04-29 | Resolved: 2021-07-20

## 2021-07-20 PROBLEM — O26.893 ABDOMINAL PAIN DURING PREGNANCY IN THIRD TRIMESTER: Status: RESOLVED | Noted: 2021-04-30 | Resolved: 2021-07-20

## 2021-07-20 PROBLEM — O26.893 ABDOMINAL PAIN IN PREGNANCY, THIRD TRIMESTER: Status: RESOLVED | Noted: 2021-04-29 | Resolved: 2021-07-20

## 2021-07-20 PROBLEM — O99.513 RESPIRATORY SYSTEM DISEASE COMPLICATING PREGNANCY IN THIRD TRIMESTER: Status: RESOLVED | Noted: 2021-04-08 | Resolved: 2021-07-20

## 2021-07-20 PROBLEM — R10.9 ABDOMINAL PAIN IN PREGNANCY, THIRD TRIMESTER: Status: RESOLVED | Noted: 2021-04-29 | Resolved: 2021-07-20

## 2021-07-20 PROBLEM — R10.9 ABDOMINAL PAIN DURING PREGNANCY IN THIRD TRIMESTER: Status: RESOLVED | Noted: 2021-04-30 | Resolved: 2021-07-20

## 2021-07-20 PROBLEM — O09.523 MATERNAL AGE > 35, MULTIGRAVIDA, THIRD TRIMESTER: Status: RESOLVED | Noted: 2021-04-08 | Resolved: 2021-07-20

## 2021-07-20 PROBLEM — O10.013 PRE-EXISTING ESSENTIAL HYPERTENSION COMPLICATING PREGNANCY IN THIRD TRIMESTER: Status: RESOLVED | Noted: 2021-02-28 | Resolved: 2021-07-20

## 2021-07-20 PROCEDURE — 99214 OFFICE O/P EST MOD 30 MIN: CPT | Performed by: INTERNAL MEDICINE

## 2021-07-20 RX ORDER — SERTRALINE HYDROCHLORIDE 25 MG/1
25 TABLET, FILM COATED ORAL DAILY
Qty: 30 TABLET | Refills: 0 | Status: SHIPPED | OUTPATIENT
Start: 2021-07-20 | End: 2021-08-10 | Stop reason: ALTCHOICE

## 2021-07-20 NOTE — PROGRESS NOTES
Chief Complaint   Patient presents with    Lethargy     patient just had baby- patient has had depression before, has beeing lethargic more than normal recently       Visit Vitals  BP (!) 143/75 (BP 1 Location: Right arm)   Pulse 77   Temp 97.5 °F (36.4 °C)   Resp 15   Ht 5' 6.5\" (1.689 m)   Wt 221 lb (100.2 kg)   SpO2 98%   BMI 35.14 kg/m²       1. Have you been to the ER, urgent care clinic since your last visit? Hospitalized since your last visit? NO    2. Have you seen or consulted any other health care providers outside of the [address] [place] [address] since your last visit? Include any pap smears or colon screening.  Dr Manju Orantes- through [facility]

## 2021-07-20 NOTE — PROGRESS NOTES
Hailey Sahu (: 1980) is a 46 y.o. female, established patient, here for evaluation of the following chief complaint(s):  Lethargy (patient just had baby- patient has had depression before, has beeing lethargic more than normal recently)     Written by Pippa Deutsch, as dictated by Dr. Ansley Ledesma MD.      ASSESSMENT/PLAN:  Below is the assessment and plan developed based on review of pertinent history, physical exam, labs, studies, and medications. 1. Other fatigue  Explained fatigue may be due to depression or stress at home. Ordered labs to check CBC levels and TSH levels. Waiting for results. -     CBC W/O DIFF; Future  -     TSH 3RD GENERATION; Future    2. Postpartum depression  Prescribed Zoloft 25 mg, take 1 tab everyday. If the medication makes patient sleepy, recommended taking 1/2 a tab. -     sertraline (ZOLOFT) 25 mg tablet; Take 1 Tablet by mouth daily for 30 days. , Normal, Disp-30 Tablet, R-0 sent to pharmacy. 3. Obesity (BMI 30-39. 9)  Advised pt to purchase a FitBit or similar device. Discussed that a healthy lifestyle requires 5,000-7,000 steps daily, but weight loss requires 10,000 steps daily. 4. Pain in both lower extremities  Explained pain may be due to weight. Will check electrolytes as well. Encouraged patient to lose weight to help subside pain. Ordered labs to check metabolic panel. Waiting for results. -     METABOLIC PANEL, COMPREHENSIVE; Future    5. Need for hepatitis C screening test  Ordered Hepatitis C test. Waiting for results.  -     HEPATITIS C AB; Future    6. Vitamin D deficiency  Ordered lab work to check Vitamin D levels. Waiting for results. Recommend that patient take a Vitamin D supplement of 1,000 units daily. -     VITAMIN D, 25 HYDROXY; Future      SUBJECTIVE/OBJECTIVE:  HPI     Patient presents today for a follow up visit. She c/o fatigue, headaches, and depression.  She is dealing with court battles with her fathers children and her mother so she is dealing with a lot of stress. She has been telling people \"no\" to events she does not want to attend and trying to remove the stress from her life. She has taken some medication for depression in the past. She notes a nervous break in the past.     She feels like her weight is too high because when she walks her knees hurt. When she stands up her legs feel like they are going to \"buckle. \" Her appetite fluctuates. She is interested in a gastric bypass or gastric sleeve surgery. Her BP today is 143/75, 136/84 manual repeat. She checks her BP at home and the readings are on the higher side. At home her readings average [id number] systolic. She notes her monitor may be broken due to her kids playing with the machine. She has a fhx of high BP, diabetes, and blood clots (grandmother, mom, and uncle on moms side). She just had a baby on 4/30/21. During her pregnancy her BP was high. During labor her BP was high. At her follow up visit in June with the Dr. Tio Figueredo, Gynecology, her BP was within normal ranges. She does not remember getting a tetanus shot for this pregnancy. This is her 5th child. She completes a Pap smear at her last follow up visit in June, results were unremarkable. All of her children had low vitamin D levels at their most recent pediatrician appointment. She does not take a vitamin D supplement. Patient Active Problem List   Diagnosis Code    Mild intermittent asthma without complication Z50.63    History of herpes genitalis Z86.19    Elevated blood pressure affecting pregnancy in third trimester, antepartum O16.3        Current Outpatient Medications on File Prior to Visit   Medication Sig Dispense Refill    multivitamin, tx-iron-ca-min (THERA-M w/ IRON) 9 mg iron-400 mcg tab tablet Take 1 Tablet by mouth daily.  albuterol (PROVENTIL HFA, VENTOLIN HFA, PROAIR HFA) 90 mcg/actuation inhaler Take 1 Puff by inhalation every six (6) hours as needed for Wheezing.  1 Inhaler 4    acetaminophen (TylenoL) 325 mg tablet Take 500 mg by mouth every four (4) hours as needed for Pain. Indications: pain       No current facility-administered medications on file prior to visit. Allergies   Allergen Reactions    Lactose Diarrhea       Past Medical History:   Diagnosis Date    Asthma      Pt has not needed an inhaler for a year.  Essential hypertension     Genital herpes     denies current outbreak    Psychiatric problem     depression    Severe obesity ([address] 3/4/2020    Vaginal bleeding 11/15/2020       History reviewed. No pertinent surgical history.     Family History   Problem Relation Age of Onset    Asthma Mother     Asthma Brother        Social History     Socioeconomic History    Marital status:      Spouse name: Not on file    Number of children: 3    Years of education: Not on file    Highest education level: Not on file   Occupational History    Not on file   Tobacco Use    Smoking status: Never Smoker    Smokeless tobacco: Never Used   Vaping Use    Vaping Use: Never used   Substance and Sexual Activity    Alcohol use: No    Drug use: No    Sexual activity: Yes     Partners: Male     Birth control/protection: None   Other Topics Concern     Service Not Asked    Blood Transfusions Not Asked    Caffeine Concern Not Asked    Occupational Exposure Not Asked    Hobby Hazards Not Asked    Sleep Concern Not Asked    Stress Concern Not Asked    Weight Concern Not Asked    Special Diet Not Asked    Back Care Not Asked    Exercise Not Asked    Bike Helmet Not Asked   [address] Not Asked    Self-Exams Not Asked   Social History Narrative    Not on file     Social Determinants of Health     Financial Resource Strain:     Difficulty of Paying Living Expenses:    Food Insecurity:     Worried About Running Out of Food in the Last Year:     Josette of Food in the Last Year:    Transportation Needs:     Lack of Transportation (Medical):  Lack of Transportation (Non-Medical):    Physical Activity:     Days of Exercise per Week:     Minutes of Exercise per Session:    Stress:     Feeling of Stress :    Social Connections:     Frequency of Communication with Friends and Family:     Frequency of Social Gatherings with Friends and Family:     Attends [employer] Services:     Active Member of Clubs or Organizations:     Attends Club or Organization Meetings:     Marital Status:    Intimate Partner Violence:     Fear of Current or Ex-Partner:     Emotionally Abused:     Physically Abused:     Sexually Abused:        Admission on 04/29/2021, Discharged on 05/02/2021   Component Date Value Ref Range Status    WBC 04/29/2021 9.3  3.6 - 11.0 K/uL Final    RBC 04/29/2021 4.38  3.80 - 5.20 M/uL Final    HGB 04/29/2021 11.9  11.5 - 16.0 g/dL Final    HCT 04/29/2021 36.9  35.0 - 47.0 % Final    MCV 04/29/2021 84.2  80.0 - 99.0 FL Final    MCH 04/29/2021 27.2  26.0 - 34.0 PG Final    MCHC 04/29/2021 32.2  30.0 - 36.5 g/dL Final    RDW 04/29/2021 16.1* 11.5 - 14.5 % Final    PLATELET 13/58/3684 875  150 - 400 K/uL Final    MPV 04/29/2021 10.6  8.9 - 12.9 FL Final    NRBC 04/29/2021 0.0  0  WBC Final    ABSOLUTE NRBC 04/29/2021 0.00  0.00 - 0.01 K/uL Final    Protein, urine random 04/29/2021 36* 0.0 - 11.9 mg/dL Final    Creatinine, urine 04/29/2021 187.00  mg/dL Final    No reference range has been established.  Protein/Creat.  urine Ratio 04/29/2021 0.2    Final    Sodium 04/29/2021 136  136 - 145 mmol/L Final    Potassium 04/29/2021 3.9  3.5 - 5.1 mmol/L Final    Chloride 04/29/2021 105  97 - 108 mmol/L Final    CO2 04/29/2021 20* 21 - 32 mmol/L Final    Anion gap 04/29/2021 11  5 - 15 mmol/L Final    Glucose 04/29/2021 87  65 - 100 mg/dL Final    BUN 04/29/2021 9  6 - 20 MG/DL Final    Creatinine 04/29/2021 0.39* 0.55 - 1.02 MG/DL Final    BUN/Creatinine ratio 04/29/2021 23* 12 - 20   Final    GFR est AA 04/29/2021 >60  >60 ml/min/1.73m2 Final    GFR est non-AA 04/29/2021 >60  >60 ml/min/1.73m2 Final    Estimated GFR is calculated using the IDMS-traceable Modification of Diet in Renal Disease (MDRD) Study equation, reported for both  Americans (GFRAA) and non- Americans (GFRNA), and normalized to 1.73m2 body surface area. The physician must decide which value applies to the patient.  Calcium 04/29/2021 8.6  8.5 - 10.1 MG/DL Final    Bilirubin, total 04/29/2021 0.3  0.2 - 1.0 MG/DL Final    ALT (SGPT) 04/29/2021 13  12 - 78 U/L Final    AST (SGOT) 04/29/2021 11* 15 - 37 U/L Final    Alk. phosphatase 04/29/2021 159* 45 - 117 U/L Final    Protein, total 04/29/2021 6.5  6.4 - 8.2 g/dL Final    Albumin 04/29/2021 2.6* 3.5 - 5.0 g/dL Final    Globulin 04/29/2021 3.9  2.0 - 4.0 g/dL Final    A-G Ratio 04/29/2021 0.7* 1.1 - 2.2   Final       Review of Systems   Constitutional: Positive for appetite change and fatigue. Negative for activity change and unexpected weight change. HENT: Negative for congestion, hearing loss, rhinorrhea and sore throat. Eyes: Negative for discharge. Respiratory: Negative for cough, chest tightness and shortness of breath. Cardiovascular: Negative for leg swelling. Gastrointestinal: Negative for abdominal pain, constipation and diarrhea. Genitourinary: Negative for dysuria, flank pain, frequency and urgency. Musculoskeletal: Negative for arthralgias, back pain and myalgias. Skin: Negative for color change and rash. Neurological: Positive for headaches. Negative for dizziness and light-headedness. Psychiatric/Behavioral: Positive for dysphoric mood (depression). Negative for sleep disturbance. The patient is not nervous/anxious.       Visit Vitals  /84 (BP 1 Location: Right arm, BP Patient Position: Sitting)   Pulse 77   Temp 97.5 °F (36.4 °C)   Resp 15   Ht 5' 6.5\" (1.689 m)   Wt 221 lb (100.2 kg)   SpO2 98%   BMI 35.14 kg/m²       Physical Exam  Vitals and nursing note reviewed. Constitutional:       General: She is not in acute distress. Appearance: Normal appearance. She is well-developed. She is not diaphoretic. HENT:      Right Ear: External ear normal.      Left Ear: External ear normal.   Eyes:      General: No scleral icterus. Right eye: No discharge. Left eye: No discharge. Extraocular Movements: Extraocular movements intact. Conjunctiva/sclera: Conjunctivae normal.   Cardiovascular:      Rate and Rhythm: Normal rate and regular rhythm. Pulmonary:      Effort: Pulmonary effort is normal.      Breath sounds: Normal breath sounds. No wheezing. Abdominal:      General: Bowel sounds are normal.      Palpations: Abdomen is soft. Tenderness: There is no abdominal tenderness. Musculoskeletal:      Cervical back: Normal range of motion and neck supple. Lymphadenopathy:      Cervical: No cervical adenopathy. Neurological:      Mental Status: She is alert and oriented to person, place, and time. Psychiatric:         Mood and Affect: Mood and affect normal.               An electronic signature was used to authenticate this note.   -- Ghada Call

## 2021-07-21 LAB
25(OH)D3+25(OH)D2 SERPL-MCNC: 10.2 NG/ML (ref 30–100)
ALBUMIN SERPL-MCNC: 4.5 G/DL (ref 3.8–4.8)
ALBUMIN/GLOB SERPL: 1.4 {RATIO} (ref 1.2–2.2)
ALP SERPL-CCNC: 104 IU/L (ref 48–121)
ALT SERPL-CCNC: 27 IU/L (ref 0–32)
AST SERPL-CCNC: 15 IU/L (ref 0–40)
BILIRUB SERPL-MCNC: 0.3 MG/DL (ref 0–1.2)
BUN SERPL-MCNC: 11 MG/DL (ref 6–20)
BUN/CREAT SERPL: 19 (ref 9–23)
CALCIUM SERPL-MCNC: 9.3 MG/DL (ref 8.7–10.2)
CHLORIDE SERPL-SCNC: 102 MMOL/L (ref 96–106)
CO2 SERPL-SCNC: 22 MMOL/L (ref 20–29)
CREAT SERPL-MCNC: 0.58 MG/DL (ref 0.57–1)
ERYTHROCYTE [DISTWIDTH] IN BLOOD BY AUTOMATED COUNT: 14.8 % (ref 11.7–15.4)
GLOBULIN SER CALC-MCNC: 3.2 G/DL (ref 1.5–4.5)
GLUCOSE SERPL-MCNC: 87 MG/DL (ref 65–99)
HCT VFR BLD AUTO: 42.2 % (ref 34–46.6)
HCV AB S/CO SERPL IA: <0.1 S/CO RATIO (ref 0–0.9)
HGB BLD-MCNC: 13.8 G/DL (ref 11.1–15.9)
MCH RBC QN AUTO: 27.4 PG (ref 26.6–33)
MCHC RBC AUTO-ENTMCNC: 32.7 G/DL (ref 31.5–35.7)
MCV RBC AUTO: 84 FL (ref 79–97)
PLATELET # BLD AUTO: 348 X10E3/UL (ref 150–450)
POTASSIUM SERPL-SCNC: 5 MMOL/L (ref 3.5–5.2)
PROT SERPL-MCNC: 7.7 G/DL (ref 6–8.5)
RBC # BLD AUTO: 5.03 X10E6/UL (ref 3.77–5.28)
SODIUM SERPL-SCNC: 138 MMOL/L (ref 134–144)
TSH SERPL DL<=0.005 MIU/L-ACNC: 0.94 UIU/ML (ref 0.45–4.5)
WBC # BLD AUTO: 7 X10E3/UL (ref 3.4–10.8)

## 2021-08-09 ENCOUNTER — TELEPHONE (OUTPATIENT)
Dept: PRIMARY CARE CLINIC | Age: 41
End: 2021-08-09

## 2021-08-09 NOTE — TELEPHONE ENCOUNTER
Spoke with patient on the phone, patient recently broke driving foot, we switched appointment to [facility]. Patient has an appointment for tomorrow but c/o high BP, having headaches and sometimes having the blurry vision associated. Pt has not been staying hydrated and said that she has been drinking juices. I told her she needs to increase her water in take and I will see if Dr Goyo Healy is wanting to recommend anything else before appointment tomorrow.     Patient does not have BP machine, just knows how the high BP feels

## 2021-08-10 ENCOUNTER — VIRTUAL VISIT (OUTPATIENT)
Dept: PRIMARY CARE CLINIC | Age: 41
End: 2021-08-10
Payer: MEDICAID

## 2021-08-10 DIAGNOSIS — S82.391D OTHER CLOSED FRACTURE OF DISTAL END OF RIGHT TIBIA WITH ROUTINE HEALING, SUBSEQUENT ENCOUNTER: ICD-10-CM

## 2021-08-10 DIAGNOSIS — F32.9 REACTIVE DEPRESSION: ICD-10-CM

## 2021-08-10 DIAGNOSIS — I10 ESSENTIAL HYPERTENSION: Primary | ICD-10-CM

## 2021-08-10 DIAGNOSIS — E55.9 VITAMIN D DEFICIENCY: ICD-10-CM

## 2021-08-10 DIAGNOSIS — R51.9 FREQUENT HEADACHES: ICD-10-CM

## 2021-08-10 PROBLEM — O16.3 ELEVATED BLOOD PRESSURE AFFECTING PREGNANCY IN THIRD TRIMESTER, ANTEPARTUM: Status: RESOLVED | Noted: 2021-04-30 | Resolved: 2021-08-10

## 2021-08-10 PROCEDURE — 99214 OFFICE O/P EST MOD 30 MIN: CPT | Performed by: INTERNAL MEDICINE

## 2021-08-10 RX ORDER — PROPRANOLOL HYDROCHLORIDE 10 MG/1
10 TABLET ORAL 2 TIMES DAILY
Qty: 60 TABLET | Refills: 0 | Status: SHIPPED | OUTPATIENT
Start: 2021-08-10 | End: 2021-09-09

## 2021-08-10 RX ORDER — SERTRALINE HYDROCHLORIDE 50 MG/1
50 TABLET, FILM COATED ORAL DAILY
Qty: 30 TABLET | Refills: 0 | Status: SHIPPED | OUTPATIENT
Start: 2021-08-10 | End: 2021-09-09

## 2021-08-10 RX ORDER — ERGOCALCIFEROL 1.25 MG/1
50000 CAPSULE ORAL
Qty: 4 CAPSULE | Refills: 1 | Status: SHIPPED | OUTPATIENT
Start: 2021-08-10 | End: 2021-09-29
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
## 2021-12-13 NOTE — ADT AUTH CERT NOTES
Observation notification of admissions  Patient admitted 2/2/17 at 4:59pm  Patient discharged 2/2/17 at 6:12pm    `   Allergies (verified on: 02/02/17)    `     (No Known Allergies)   `          `   Diagnosis    `     Nonintractable episodic headache, unspecified headache type [R51]    Layo Biswas ([id number])  Patient Demographics       Name:  Angel Doan  MRN:  [id number]     Address:  [address] [place] [address]  SSN:           Sex:  Female     Home Phone:  [phone]  YOB: 1980 (36 yrs)     Work Phone:    E-Mail Address:  [email or web address]     Registration Status:  Verified  PCP:  Lizzy Vargas     Date Last Verified:    Employer:  NOT EMPLOYED     Next Review Date:  2/28/2017                      Hospital Account       Name:  [facility] Account ID:  [de-identified]     Class:  OBSERVATION  Status:  Discharged/Not Chaneta Lefort     GuarantorTheresa Face - [id number]  Primary Coverage:  [address]     External Hosp Acct ID:  [[id number]]           Guarantor Account Information                 Account ID - Guarantor  Service Area  Active?   Guarantor Account Type  Guarantor Account Status  Nick Status            [id number] - Deliah Pert  Yes  P/F    Verified           Coverages:    [address] [place]                   Coverage Information         F/O  Payor/Plan  Subscriber Name  Eff From  Eff To  Nick Status     1  [employer]ET[employer] BET*/[place] [employer] *  COREY BOSWELL  10/01/2016              Admission Information       Attending Provider:  Olman Hdz MD  Auth/Cert Status:  Incomplete     Admitting Provider:  Rip Anand MD  [facility]     Admission Type:  EMERGENCY  Unit:  [facility] EMERGENCY DEPT     Hospital Service:  MEDICINE  Room:  [facility]     Admission Date:  2/2/2017  Bed:       Discharge Date:  2/2/2017 MEDICATIONS  (STANDING):  clotrimazole 1% Cream 1 Application(s) Topical two times a day  folic acid 1 milliGRAM(s) Oral daily  LORazepam     Tablet 2 milliGRAM(s) Oral once  multivitamin 1 Tablet(s) Oral daily  nicotine - 21 mG/24Hr(s) Patch 1 patch Transdermal daily  QUEtiapine 200 milliGRAM(s) Oral at bedtime  risperiDONE   Tablet 1 milliGRAM(s) Oral two times a day    MEDICATIONS  (PRN):  acetaminophen     Tablet .. 650 milliGRAM(s) Oral every 6 hours PRN Moderate Pain (4 - 6)  aluminum hydroxide/magnesium hydroxide/simethicone Suspension 30 milliLiter(s) Oral every 4 hours PRN Dyspepsia  haloperidol     Tablet 5 milliGRAM(s) Oral every 6 hours PRN agitation, administered together with lorazepam  magnesium hydroxide Suspension 30 milliLiter(s) Oral daily PRN Constipation  nicotine  Polacrilex Gum 2 milliGRAM(s) Oral every 2 hours PRN cravings  QUEtiapine 50 milliGRAM(s) Oral at bedtime PRN insomnia

## 2022-12-22 NOTE — ED PROVIDER NOTES
HPI Comments: 31 y.o. female with past medical history significant for asthma who presents with chief complaint of headache. Pt complains of cold like sx of HA, dizziness, cough, sore throat and generalized body aches which have persisted for the past several days. Pt has taken Tylenol for pain, but has experienced little relief. Pt also mentions sx of abdominal cramping and diarrhea. Pt is currently 25 weeks pregnant. Upon arrival, pt was taken to labor and delivery for evaluation; she was cleared from L&D and referred back to the ED. Pt mentions that she the sx of vomiting has been present throughout her entire pregnancy. Pt denies fever, sinus pressure. There are no other acute medical concerns at this time. PCP: None    Note written by Daniel Pardo. Adin Said, as dictated by Bobby Hidalgo MD 6:04 PM      The history is provided by the patient. No  was used. Past Medical History:   Diagnosis Date    Asthma      as a child       History reviewed. No pertinent past surgical history. Family History:   Problem Relation Age of Onset    Asthma Mother     Asthma Brother        Social History     Social History    Marital status:      Spouse name: N/A    Number of children: N/A    Years of education: N/A     Occupational History    Not on file. Social History Main Topics    Smoking status: Never Smoker    Smokeless tobacco: Not on file    Alcohol use No    Drug use: No    Sexual activity: Yes     Partners: Male     Birth control/ protection: None     Other Topics Concern    Not on file     Social History Narrative    No narrative on file         ALLERGIES: Review of patient's allergies indicates no known allergies. Review of Systems   Constitutional: Negative. Negative for appetite change, fever and unexpected weight change. HENT: Positive for sore throat. Negative for ear pain, hearing loss, nosebleeds, rhinorrhea and trouble swallowing.     Respiratory: Mom calling and stated patient needs an antibiotic called in for dental procedure.    Please follow up with mom.       Positive for cough. Negative for chest tightness and shortness of breath. Cardiovascular: Negative. Negative for chest pain and palpitations. Gastrointestinal: Positive for abdominal pain, diarrhea and vomiting. Negative for abdominal distention and blood in stool. Endocrine: Negative. Genitourinary: Negative for dysuria and hematuria. Musculoskeletal: Positive for myalgias (generalized). Negative for back pain. Skin: Negative. Negative for rash. Allergic/Immunologic: Negative. Neurological: Positive for dizziness and headaches. Negative for syncope, weakness and numbness. Hematological: Negative. Psychiatric/Behavioral: Negative. All other systems reviewed and are negative. Vitals:    02/02/17 1707 02/02/17 1711   Temp:  98.7 °F (37.1 °C)   Weight: 91.2 kg (201 lb)    Height: 5' 6\" (1.676 m)             Physical Exam   Constitutional: She is oriented to person, place, and time. She appears well-developed and well-nourished. No distress. HENT:   Head: Normocephalic and atraumatic. Right Ear: External ear normal.   Left Ear: External ear normal.   Nose: Nose normal.   Mouth/Throat: Oropharynx is clear and moist.   Eyes: Conjunctivae and EOM are normal. Pupils are equal, round, and reactive to light. Neck: Normal range of motion. Neck supple. No JVD present. No thyromegaly present. Cardiovascular: Normal rate, regular rhythm, normal heart sounds and intact distal pulses. No murmur heard. Pulmonary/Chest: Effort normal and breath sounds normal. No respiratory distress. She has no wheezes. She has no rales. Abdominal: Soft. Bowel sounds are normal. She exhibits no distension. There is no tenderness. Musculoskeletal: Normal range of motion. She exhibits no edema. Neurological: She is alert and oriented to person, place, and time. No cranial nerve deficit. Skin: Skin is warm and dry. No rash noted. Psychiatric: She has a normal mood and affect.  Her behavior is normal. Thought content normal.   Nursing note and vitals reviewed. Note written by Eve Guardian. Kary Pascal, as dictated by Katarina Cotto MD 6:04 PM         [facility]  ED Course       Procedures    PROGRESS NOTE:  6:11 PM  Assessment: pt has a non-specific headache. She has no symptoms or findings suggestive of an intracranial process. Will d/c pt home with albuterol and recommendation of Tylenol for HA.